# Patient Record
Sex: FEMALE | Race: WHITE | NOT HISPANIC OR LATINO | Employment: OTHER
[De-identification: names, ages, dates, MRNs, and addresses within clinical notes are randomized per-mention and may not be internally consistent; named-entity substitution may affect disease eponyms.]

---

## 2021-10-31 ENCOUNTER — HEALTH MAINTENANCE LETTER (OUTPATIENT)
Age: 86
End: 2021-10-31

## 2021-11-26 ENCOUNTER — TRANSFERRED RECORDS (OUTPATIENT)
Dept: HEALTH INFORMATION MANAGEMENT | Facility: CLINIC | Age: 86
End: 2021-11-26
Payer: MEDICARE

## 2021-12-01 ENCOUNTER — MEDICAL CORRESPONDENCE (OUTPATIENT)
Dept: HEALTH INFORMATION MANAGEMENT | Facility: CLINIC | Age: 86
End: 2021-12-01
Payer: MEDICARE

## 2021-12-02 ENCOUNTER — TRANSCRIBE ORDERS (OUTPATIENT)
Dept: OTHER | Age: 86
End: 2021-12-02
Payer: MEDICARE

## 2021-12-02 DIAGNOSIS — C44.310 BASAL CELL CARCINOMA (BCC) OF FACE: Primary | ICD-10-CM

## 2021-12-15 ENCOUNTER — TELEPHONE (OUTPATIENT)
Dept: DERMATOLOGY | Facility: CLINIC | Age: 86
End: 2021-12-15
Payer: MEDICARE

## 2021-12-15 NOTE — TELEPHONE ENCOUNTER
FUTURE VISIT INFORMATION      FUTURE VISIT INFORMATION:    Date: 12.21.21    Time: 1:30    Location: Telephone  REFERRAL INFORMATION:    Referring provider:  Dr. Whalen    Referring providers clinic:  [facility]    Reason for visit/diagnosis  consult for BCC on left temple     RECORDS REQUESTED FROM:       Clinic name Comments Records Status Photos Status   [facility] 11.26.21  Dr. Whalen  Path # [id number] CE Received

## 2021-12-21 ENCOUNTER — PRE VISIT (OUTPATIENT)
Dept: DERMATOLOGY | Facility: CLINIC | Age: 86
End: 2021-12-21
Payer: MEDICARE

## 2021-12-21 ENCOUNTER — VIRTUAL VISIT (OUTPATIENT)
Dept: DERMATOLOGY | Facility: CLINIC | Age: 86
End: 2021-12-21
Payer: MEDICARE

## 2021-12-21 DIAGNOSIS — C44.319 BASAL CELL CARCINOMA OF LEFT TEMPLE REGION: Primary | ICD-10-CM

## 2021-12-21 PROCEDURE — 99441 PR PHYSICIAN TELEPHONE EVALUATION 5-10 MIN: CPT | Performed by: DERMATOLOGY

## 2021-12-21 NOTE — LETTER
12/21/2021       RE: Sera Diaz  [address]  [place] [address]     Dear Colleague,    Thank you for referring your patient, Sera Diaz, to the [facility] DERMATOLOGIC SURGERY CLINIC [place] at . Please see a copy of my visit note below.    Mohs Micrographic Surgery Consult Note    Dec 21, 2021  Start time (if telephone encounter): 1:30 p.m.  End time (if telephone encounter): 1:40 p.m.    Dermatology Problem List:  # BCC, left temple, pending Mohs    Subjective: The patient is a 86 year old woman who presents today for Mohs micrographic surgery consultation for a recent diagnosis of skin cancer.    Skin cancer(s): BCC  Location(s): left temple  Associated symptoms: pruritus, tenderness to touch  Onset: within last 1 year    No other associated symptoms, modifying factors, or prior treatments, except when noted above. The patient denies any constitutional symptoms, lymphadenopathy, unintentional weight loss or decreased appetite. No other skin concerns today.    Objective:   Skin: Photos not submitted    Assessment and Plan:     1. Plan for Mohs micrographic surgery for skin cancer(s) above:  - We discussed the nature of the diagnosis/condition above. We discussed the treatment options, including the risks benefits and expectations of these options. We recommend micrographic surgery as the most effective and most tissue sparing option for treatment, and the patient agrees to proceed with this.  The patient is aware of the risks, benefits and expectations of this procedure. The patient will be scheduled for this procedure, if not already done so.  - We anticipate the following closure type: Linear vs flap    The patient was discussed with and evaluated by attending physician, Ilia Fulton MD.    Sterling Serna MD  Micrographic Surgery and Dermatologic Oncology (MSDO) Fellow    Scribe Disclosure:  Trey LORA am  serving as a scribe to document services personally performed by Ilia Fulton MD based on data collection and the provider's statements to me.

## 2021-12-21 NOTE — PROGRESS NOTES
Mohs Micrographic Surgery Consult Note    Dec 21, 2021  Start time (if telephone encounter): 1:30 p.m.  End time (if telephone encounter): 1:40 p.m.    Dermatology Problem List:  # BCC, left temple, pending Mohs    Subjective: The patient is a 86 year old woman who presents today for Mohs micrographic surgery consultation for a recent diagnosis of skin cancer.    Skin cancer(s): BCC  Location(s): left temple  Associated symptoms: pruritus, tenderness to touch  Onset: within last 1 year    No other associated symptoms, modifying factors, or prior treatments, except when noted above. The patient denies any constitutional symptoms, lymphadenopathy, unintentional weight loss or decreased appetite. No other skin concerns today.    Objective:   Skin: Photos not submitted    Assessment and Plan:     1. Plan for Mohs micrographic surgery for skin cancer(s) above:  - We discussed the nature of the diagnosis/condition above. We discussed the treatment options, including the risks benefits and expectations of these options. We recommend micrographic surgery as the most effective and most tissue sparing option for treatment, and the patient agrees to proceed with this.  The patient is aware of the risks, benefits and expectations of this procedure. The patient will be scheduled for this procedure, if not already done so.  - We anticipate the following closure type: Linear vs flap    The patient was discussed with and evaluated by attending physician, Ilia Fulton MD.    Sterling Serna MD  Micrographic Surgery and Dermatologic Oncology (MSDO) Fellow    Scribe Disclosure:  Trey LORA, am serving as a scribe to document services personally performed by Ilia Fulton MD based on data collection and the provider's statements to me.

## 2022-01-12 ENCOUNTER — OFFICE VISIT (OUTPATIENT)
Dept: DERMATOLOGY | Facility: CLINIC | Age: 87
End: 2022-01-12
Payer: MEDICARE

## 2022-01-12 VITALS — SYSTOLIC BLOOD PRESSURE: 133 MMHG | HEART RATE: 81 BPM | DIASTOLIC BLOOD PRESSURE: 67 MMHG

## 2022-01-12 DIAGNOSIS — C44.319 BASAL CELL CARCINOMA (BCC) OF LEFT TEMPLE REGION: Primary | ICD-10-CM

## 2022-01-12 PROCEDURE — 14040 TIS TRNFR F/C/C/M/N/A/G/H/F: CPT | Mod: GC | Performed by: DERMATOLOGY

## 2022-01-12 PROCEDURE — 17311 MOHS 1 STAGE H/N/HF/G: CPT | Mod: GC | Performed by: DERMATOLOGY

## 2022-01-12 PROCEDURE — 17312 MOHS ADDL STAGE: CPT | Mod: GC | Performed by: DERMATOLOGY

## 2022-01-12 RX ORDER — CYCLOSPORINE 0.5 MG/ML
1 EMULSION OPHTHALMIC
COMMUNITY
Start: 2021-12-08 | End: 2023-08-08

## 2022-01-12 RX ORDER — AMLODIPINE BESYLATE 2.5 MG/1
2.5 TABLET ORAL DAILY
Status: ON HOLD | COMMUNITY
Start: 2021-12-08 | End: 2023-09-01

## 2022-01-12 RX ORDER — DILTIAZEM HYDROCHLORIDE 60 MG/1
TABLET, FILM COATED ORAL
COMMUNITY
Start: 2021-09-29 | End: 2023-08-08

## 2022-01-12 RX ORDER — CLONAZEPAM 0.5 MG/1
TABLET ORAL
COMMUNITY
Start: 2021-09-08 | End: 2023-08-08

## 2022-01-12 RX ORDER — CYCLOSPORINE 0.5 MG/ML
EMULSION OPHTHALMIC
COMMUNITY
Start: 2021-12-20 | End: 2023-08-08

## 2022-01-12 RX ORDER — AMLODIPINE BESYLATE 2.5 MG/1
TABLET ORAL
COMMUNITY
Start: 2021-12-31 | End: 2023-08-08

## 2022-01-12 RX ORDER — ALENDRONATE SODIUM 70 MG/1
70 TABLET ORAL
COMMUNITY
Start: 2021-08-18 | End: 2023-08-08

## 2022-01-12 RX ORDER — BUDESONIDE 180 UG/1
AEROSOL, POWDER RESPIRATORY (INHALATION)
COMMUNITY
Start: 2021-12-27 | End: 2023-08-08

## 2022-01-12 RX ORDER — ALENDRONATE SODIUM 70 MG/1
TABLET ORAL
COMMUNITY
Start: 2021-11-08 | End: 2023-08-08

## 2022-01-12 NOTE — PATIENT INSTRUCTIONS
Wound Care Instructions  I will experience scar, altered skin color, bleeding, swelling, pain, crusting and redness. I may experience altered sensation. Risks are excessive bleeding, infection, muscle weakness, thick (hypertrophic or keloidal) scar, and recurrence,. A second procedure may be recommended to obtain the best cosmetic or functional result.  Possible complications of any surgical procedure are bleeding, infection, scarring, alteration in skin color and sensation, muscle weakness in the area, wound dehiscence or seperation, or recurrence of the lesion or disease. On occasion, after healing, a secondary procedure or revision may be recommended in order to obtain the best cosmetic or functional result.   After your surgery, a pressure bandage will be placed over the area that has sutures. This will help prevent bleeding.   For the First 48 hours After Surgery:  1. Leave the pressure bandage on and keep it dry. If it should come loose, you may retape it, but do not take it off.  2. Relax and take it easy. Do not do any vigorous exercise, heavy lifting, or bending forward. This could cause the wound to bleed.  3. Post-operative pain is usually mild. You may take 1000 mg of extra strength Tylenol every 6 hours as needed (do not take more than 4,000mg in one day). Alternate with 600mg of ibuprofen every 6 hours as well. Alternating Tylenol and Ibuprofen will be most effective for pain control. For instance, take 1000mg of Tylenol at 1pm, take 600mg of ibuprofen at 3pm, retake tylenol at 7pm (6 hours after last dose), reake ibuprofen at 9pm (6 hours after last dose). Avoid alcohol and vitamin E as these may increase your tendency to bleed.  4. You may put an ice pack around the bandaged area for 20 minutes every 2-3 hours. This may help reduce swelling, bruising, and pain. Make sure the ice pack is waterproof so that the pressure bandage does not get wet.   5. You may see a small amount of drainage or blood on  your pressure bandage. This is normal. However, if drainage or bleeding continues or saturates the bandage, you will need to apply firm pressure over the bandage with a washcloth for 15 minutes. If bleeding continues after applying pressure for 15 minutes then go to the nearest emergency room.  48 Hours After Surgery  Carefully remove the bandage and start daily wound care and dressing changes. You may also now shower and get the wound wet. Wash wound with a mild soap and water.  Use caution when washing the wound. Be gentle and do not let the forceful shower stream hit the wound directly.  PAT dry.  Daily Wound Care:  1. Wash wound with a mild soap and water.  Use caution when washing the wound, be gentle and do not let the forceful shower stream hit the wound directly.  2. PAT DRY.  3. Apply Vaseline (from a new container or tube) over the suture line with a Q-tip. It is very important to keep the wound continuously moist, as wounds heal best in a moist environment.  4.  Keep the site covered until sutures are removed, you can cover it with a Telfa (non-stick) dressing and tape or a band-aid.    5. If you are unable to keep wound covered, you must apply Vaseline every 2 - 3 hours (while awake) to ensure it is being kept moist for optimal healing. A dressing overnight is recommended to keep the area moist.   Call Us If:  1. You have pain that is not controlled with Tylenol.  2. You have signs or symptoms of an infection, such as: fever over 100 degrees F, redness, warmth, or foul-smelling or yellow/creamy drainage from the wound.  Who should I call with questions?    [facility]: [phone]     [facility]: [phone]    For urgent needs outside of business hours call the [facility] at [phone] and ask for the dermatology resident on call

## 2022-01-12 NOTE — LETTER
1/12/2022       RE: Sera Diaz  [address]  [place] [address]     Dear Colleague,    Thank you for referring your patient, Sera Diaz, to the [facility] DERMATOLOGIC SURGERY CLINIC [place] at [facility]. Please see a copy of my visit note below.    [facility] Mohs Surgery Procedure Note    Case #: 1  Date of Service:  Jan 12, 2022  Surgery: Mohs micrographic surgery (MMS)  Staff surgeon: Ilia Fulton MD  Fellow surgeon: Sterling Serna MD  Resident surgeon: Leeanna Atwood MD  Nurse: Gabriella Weinberg RN    Tumor Type: BCC  Location: Left temple  Derm-Path Accession #: Outside path from [facility]    Mohs Accession #:   Pre-Op Size: 1.0 cm x 1.0 cm  Final Defect Size: 2.1 cm x 1.9 cm  Number of Mohs stages: 2  Level of Defect: Fascia  Local anesthetic: 6 mL 1% lidocaine with epinephrine  Repair Type: Transposition flap  Repair Size: 3 x 1.8 cm  Suture Material: 4-0 Monocryl; 5-0 fast absorbing gut    Procedure:    Stage I  We discussed the principles of treatment and most likely complications including scarring, bleeding, infection, swelling, pain, crusting, nerve damage, large wound,  incomplete excision, wound dehiscence,  nerve damage, recurrence, and a second procedure may be recommended to obtain the best cosmetic or functional result.    Informed consent was obtained and the patient underwent the procedure as follows:  The patient was placed supine on the operating table.  The cancer was identified, outlined with a marker, and verified by the patient.  The entire surgical field was prepped with chlorhexidine.  The surgical site was anesthetized using lidocaine with epinephrine.    The area of clinically apparent tumor was debulked. The layer of tissue was then surgically excised using a #15 blade and was then transferred onto a specimen sheet maintaining the orientation of the specimen. Hemostasis was  obtained using electrocoagulation. The wound site was then covered with a dressing while the tissue samples were processed for examination.    The excised tissue was transported to the Mohs histology laboratory maintaining the tissue orientation.  The tissue specimen was relaxed so that the entire surgical margin was in a a single horizontal plane for sectioning and inked for precise mapping.  A precise reference map was drawn to reflect the sectioning of the specimen, colored inking of the margins, and orientation on the patient.  The tissue was processed using horizontal sectioning of the base and continuous peripheral margins.  The histopathologic sections were reviewed in conjunction with the reference map.    Total blocks: 1  Total slides: 2    Residual tumor was identified and indicated in red on the reference map, identifying the location where further tissue excision was necessary. Therefore, an additional stage of Mohs Micrographic surgery was deemed necessary.    Stage II  The patient was returned to the operating room, and the area prepped in the usual manner. The residual tumor was excised using the reference map as a guide. The specimen was transfered to a labeled specimen sheet maintaining the orientation of the specimen. Hemostasis was obtained and the wound site was covered with a dressing while the tissue was processed for examination.     The excised tissue was transported to the Mohs histology laboratory maintaining orientation. The specimen margins were inked for precise mapping and a reference map was prepared for the is additional stage to maintain precise orientation as described above. The tissue was processed using horizontal sectioning of the base and continuous peripheral margins. The histopathologic sections were reviewed in conjunction with the reference map.     Total blocks: 1  Total slides: 2    There were no cancer cells visualized on examination, therefore Mohs surgery was  complete.    PROCEDURE: Rhombic Transposition Flap    The patient was taken to the operative suite and placed supine on the operating room table.  The wound was identified and infiltrated with lidocaine with epinephrine.  The defect was then cleansed and prepped with chlorhexidine and draped with sterile drapes.  Using a marker, a rhomboid transposition flap was planned.  The wound edges were then debeveled and the wound was undermined bluntly in all directions.  The transposition flap was incised sharply to the level of fat.  The flap was undermined from all surrounding tissue. Hemostasis was obtained with electrocoagulation.  The flap was transposed into the primary defect. The secondary defect was closed with deep dermal 4-0 Monocryl sutures. Epidermal tissue was carefully approximated using 5-0 fast absorbing gut sutures in a simple running fashion throughout the length of the flap.  Any redundant skin was excised by the triangulation technique, and closed in similar fashion.  The wound was cleansed with sterile saline and Vaseline was applied. A sterile non-adherent pressure dressing was placed.  The patient left the operating suite in stable condition. Wound care was reviewed verbally and in writing.    Follow-up for suture removal: Not applicable as only dissolving sutures used    Ilia Fulton MD was immediately available for the entire surgery and was physicially present for the key portions of the procedure.    Dr. Sterling Serna (Mohs micrographic surgery fellow) performed the micrographic surgery; and Ilia Fulton MD performed the reconstruction/repair and was present for the entire micrographic surgery and always immediately available.    Scribe Disclosure:  Glenda LORA, am serving as a scribe to document services personally performed by Ilia Fulton MD based on data collection and the provider's statements to me.     Attestation signed by Ilia Fulton MD at 1/20/2022 11:25 AM:    Attending attestation:  GENO  was present for key elements of the procedure and immediately available for all other portions of the procedure.  I have reviewed the note and edited it as necessary.    Ilia Fulton M.D.  Professor  Director of Dermatologic Surgery  Department of Dermatology  [facility]    Dermatology Surgery Clinic  [facility] [place] [id number]

## 2022-01-12 NOTE — PROGRESS NOTES
[facility] Mohs Surgery Procedure Note    Case #: 1  Date of Service:  Jan 12, 2022  Surgery: Mohs micrographic surgery (MMS)  Staff surgeon: Ilia Fulton MD  Fellow surgeon: Sterling Serna MD  Resident surgeon: Leeanna Atwood MD  Nurse: Gabriella Weinberg RN    Tumor Type: BCC  Location: Left temple  Derm-Path Accession #: Outside path from [facility]    Mohs Accession #:   Pre-Op Size: 1.0 cm x 1.0 cm  Final Defect Size: 2.1 cm x 1.9 cm  Number of Mohs stages: 2  Level of Defect: Fascia  Local anesthetic: 6 mL 1% lidocaine with epinephrine  Repair Type: Transposition flap  Repair Size: 3 x 1.8 cm  Suture Material: 4-0 Monocryl; 5-0 fast absorbing gut    Procedure:    Stage I  We discussed the principles of treatment and most likely complications including scarring, bleeding, infection, swelling, pain, crusting, nerve damage, large wound,  incomplete excision, wound dehiscence,  nerve damage, recurrence, and a second procedure may be recommended to obtain the best cosmetic or functional result.    Informed consent was obtained and the patient underwent the procedure as follows:  The patient was placed supine on the operating table.  The cancer was identified, outlined with a marker, and verified by the patient.  The entire surgical field was prepped with chlorhexidine.  The surgical site was anesthetized using lidocaine with epinephrine.    The area of clinically apparent tumor was debulked. The layer of tissue was then surgically excised using a #15 blade and was then transferred onto a specimen sheet maintaining the orientation of the specimen. Hemostasis was obtained using electrocoagulation. The wound site was then covered with a dressing while the tissue samples were processed for examination.    The excised tissue was transported to the Mohs histology laboratory maintaining the tissue orientation.  The tissue specimen was relaxed so that the entire surgical margin was in a a single  horizontal plane for sectioning and inked for precise mapping.  A precise reference map was drawn to reflect the sectioning of the specimen, colored inking of the margins, and orientation on the patient.  The tissue was processed using horizontal sectioning of the base and continuous peripheral margins.  The histopathologic sections were reviewed in conjunction with the reference map.    Total blocks: 1  Total slides: 2    Residual tumor was identified and indicated in red on the reference map, identifying the location where further tissue excision was necessary. Therefore, an additional stage of Mohs Micrographic surgery was deemed necessary.    Stage II  The patient was returned to the operating room, and the area prepped in the usual manner. The residual tumor was excised using the reference map as a guide. The specimen was transfered to a labeled specimen sheet maintaining the orientation of the specimen. Hemostasis was obtained and the wound site was covered with a dressing while the tissue was processed for examination.     The excised tissue was transported to the Mohs histology laboratory maintaining orientation. The specimen margins were inked for precise mapping and a reference map was prepared for the is additional stage to maintain precise orientation as described above. The tissue was processed using horizontal sectioning of the base and continuous peripheral margins. The histopathologic sections were reviewed in conjunction with the reference map.     Total blocks: 1  Total slides: 2    There were no cancer cells visualized on examination, therefore Mohs surgery was complete.    PROCEDURE: Rhombic Transposition Flap    The patient was taken to the operative suite and placed supine on the operating room table.  The wound was identified and infiltrated with lidocaine with epinephrine.  The defect was then cleansed and prepped with chlorhexidine and draped with sterile drapes.  Using a marker, a rhomboid  transposition flap was planned.  The wound edges were then debeveled and the wound was undermined bluntly in all directions.  The transposition flap was incised sharply to the level of fat.  The flap was undermined from all surrounding tissue. Hemostasis was obtained with electrocoagulation.  The flap was transposed into the primary defect. The secondary defect was closed with deep dermal 4-0 Monocryl sutures. Epidermal tissue was carefully approximated using 5-0 fast absorbing gut sutures in a simple running fashion throughout the length of the flap.  Any redundant skin was excised by the triangulation technique, and closed in similar fashion.  The wound was cleansed with sterile saline and Vaseline was applied. A sterile non-adherent pressure dressing was placed.  The patient left the operating suite in stable condition. Wound care was reviewed verbally and in writing.    Follow-up for suture removal: Not applicable as only dissolving sutures used    Ilia Fulton MD was immediately available for the entire surgery and was physicially present for the key portions of the procedure.    Dr. Sterling Serna (Mohs micrographic surgery fellow) performed the micrographic surgery; and Ilia Fulton MD performed the reconstruction/repair and was present for the entire micrographic surgery and always immediately available.    Scribe Disclosure:  IGlenda, am serving as a scribe to document services personally performed by Ilia Fulton MD based on data collection and the provider's statements to me.

## 2022-04-12 ENCOUNTER — TRANSCRIBE ORDERS (OUTPATIENT)
Dept: OTHER | Age: 87
End: 2022-04-12
Payer: MEDICARE

## 2022-04-12 DIAGNOSIS — C44.311 BASAL CELL CARCINOMA (BCC) OF DORSUM OF NOSE: Primary | ICD-10-CM

## 2022-04-14 ENCOUNTER — TELEPHONE (OUTPATIENT)
Dept: DERMATOLOGY | Facility: CLINIC | Age: 87
End: 2022-04-14
Payer: MEDICARE

## 2022-04-14 NOTE — TELEPHONE ENCOUNTER
FUTURE VISIT INFORMATION      FUTURE VISIT INFORMATION:    Date: 5.25.22    Time: 9:00    Location: [place]  REFERRAL INFORMATION:    Referring provider:  Koby    Referring providers clinic:  [facility]    Reason for visit/diagnosis  BCC dorsum of nose return pt    RECORDS REQUESTED FROM:       Clinic name Comments Records Status Imaging Status   [facility] 4.1.22  Path # [id number] CE Received

## 2022-04-14 NOTE — TELEPHONE ENCOUNTER
Left patient voicemail to schedule a consult and MOHS for BCC dorsum of nose with Dr. Donaldo Ponce, Procedure

## 2022-05-19 ENCOUNTER — MYC MEDICAL ADVICE (OUTPATIENT)
Dept: DERMATOLOGY | Facility: CLINIC | Age: 87
End: 2022-05-19
Payer: MEDICARE

## 2022-05-25 ENCOUNTER — PRE VISIT (OUTPATIENT)
Dept: DERMATOLOGY | Facility: CLINIC | Age: 87
End: 2022-05-25
Payer: MEDICARE

## 2022-05-25 ENCOUNTER — OFFICE VISIT (OUTPATIENT)
Dept: DERMATOLOGY | Facility: CLINIC | Age: 87
End: 2022-05-25
Payer: MEDICARE

## 2022-05-25 VITALS — SYSTOLIC BLOOD PRESSURE: 126 MMHG | DIASTOLIC BLOOD PRESSURE: 67 MMHG

## 2022-05-25 DIAGNOSIS — C44.311 BASAL CELL CARCINOMA (BCC) OF DORSUM OF NOSE: Primary | ICD-10-CM

## 2022-05-25 PROCEDURE — 14061 TIS TRNFR E/N/E/L10.1-30SQCM: CPT | Mod: GC | Performed by: DERMATOLOGY

## 2022-05-25 PROCEDURE — 17311 MOHS 1 STAGE H/N/HF/G: CPT | Mod: GC | Performed by: DERMATOLOGY

## 2022-05-25 ASSESSMENT — PAIN SCALES - GENERAL: PAINLEVEL: NO PAIN (0)

## 2022-05-25 NOTE — PATIENT INSTRUCTIONS
Wound care    I will experience scar, bleeding, swelling, pain, crusting and redness. I may experience incomplete removal or recurrence. Risks are bleeding, bruising, swelling, infection, nerve damage, & a large wound. A second procedure may be recommended to obtain the best cosmetic or functional result.       A three month office visit with your Surgeon is recommended for scar evaluation. Please reach out sooner if you have concerns about you surgical site/wound.    Caring for your skin after surgery    After your surgery, a pressure bandage will be placed over the area that has stitches. This is important to prevent bleeding. Please follow these instructions over the next 1 to 2 weeks. Following this regimen will help to prevent complications as your wound heals.     For the first 48 hours after your surgery:    Leave the pressure dressing on and keep it dry. If it should come loose, you may re-tape it, but do not take it off.  Relax and take it easy. Do not do any vigorous exercise or heavy lifting. This could cause the wound to bleed.  Post-Operative pain is usually mild. If you are able to take tylenol, You may take plain or extra-strength Tylenol (acetaminophen) As directed on the bottle (do not take more than 4,000mg in one day). If you are able to take ibuprofen, you can alternate the tylenol and ibuprofen.   Avoid alcohol as this may increase your tendency to bleed.   You may put an ice pack around the bandaged area for 20 minutes at a time as needed. This may help reduce swelling, bruising, and pain. Make sure the ice pack is waterproof so that the pressure bandage doesn t get wet.  If the wound is on the face try to sleep with your head elevated. Either in a recliner or propped up in bed, this will decrease swelling around the eyes.   You may see a small amount of drainage or blood on your pressure bandage. This is normal. However:  If drainage or bleeding continues or saturates the bandage, you will  need to apply firm pressure over the bandage with a piece of gauze for 15 minutes.  If bleeding continues after applying pressure for 15 minutes, apply an ice pack to the bandaged area for 15 minutes.  If bleeding still continues, call our office or go to the nearest emergency room.    Remove pressure dressing 48 hours after surgery:    Carefully remove the pressure bandage. If it seems sticky or too difficult to get off, you may need to soak it off in the shower.  After the pressure dressing is removed, you may shower and get the wound wet. However, Do Not let the forceful stream of the shower hit the wound directly.  Follow these wound care and dressing change instructions:   In the shower was the surgical site last with its own separate was cloth.  You may allow water to run over the site. Take a clean wash cloth wet with soapy warm water and gently pat the suture site to help remove any crust or drainage.   Do Not rub or scrub the site    After site is clean pat dry and apply a thin layer of Vaseline ointment  over the suture site with a cotton swab or clean finger.   Cover the suture site with Telfa (non-stick) dressing. You may tape a piece of gauze over the Telfa for extra protection if you wish.  Continue wound care at least once a day, twice if you are active or around a contaminated environment.  Continue daily wound care until your surgical site is completely healed. To determine this, around 2 weeks post procedure you can take a cotton swab with a small amount of Hydrogen peroxide and roll it on the suture site. If the site bubbles and turns white your wound is still healing. Please continue wound care until the area no longer bubbles up from the hydrogen peroxide.   Dissolving stitches, if you have been told your stitches are dissolving they should dissolve in one to one and a half week. If the stiches do not dissolve by one and a half week you can use a Qtip with hydrogen peroxide on it and roll it  along the suture line to help the stitches dissolve and come out. Please give us a call if stitches are still in after two weeks. If you do not keep your wound moist at all times while it heals the dissolving sutures will dry out and not dissolve.         Follow up will be a 3 month scar evaluation either in person or via a telephone visit with you sending in a photo via [employer]. Unless you have been told to follow up sooner or if you have concerns and would like to be see sooner. Please call or send us in a [employer] message if possible and attach a photo.        What to expect:    The first couple of days your wound may be tender and may bleed slightly when doing wound care.  There may be swelling and bruising around the wound, especially if it is near the eyes. For your comfort, you may apply ice or cold compresses to the bruises after your have removed the pressure bandage.  The area around your wound may be numb for several weeks or even months.  You may experience periodic sharp pain or mild itching around the wound as it heals.   The suture line will look dark for a while but will lighten over time.       When to call us:    You have bleeding that will not stop after applying pressure and ice.  You have pain that is not controlled with Tylenol (acetaminophen.)  You have signs or symptoms of an infection such as:  Fever over 100 degrees Fahrenheit  Redness, warmth or foul-smelling drainage from the wound  If you have any questions, or are not sure how to take care of the wound.    Phone numbers:    During business hours (M-F 8:00-4:30 p.m.)  [facility]-  [phone] Option 1 appt. desk  [phone]  Option 3 nurse triage line  ---------------------------------------------------------  Evenings/Weekends/Holidays  Hospital - [phone]   TTY for hearing oohdpjfl-[phone]  *Ask  to page dermatologist on-call  Emergency Yoai-[phone]  TTY for hearing impaired-  [phone]

## 2022-05-25 NOTE — LETTER
5/25/2022       RE: Sera Diaz  [address]  [place] [address]     Dear Colleague,    Thank you for referring your patient, Sera Diaz, to the [facility] [place] [facility]. Please see a copy of my visit note below.    [facility] Mohs Surgery Procedure Note    Case #: 1  Date of Service:  May 25, 2022  Surgery: Mohs micrographic surgery (MMS)  Staff surgeon: Ilia Fulton MD  Fellow surgeon: Sterling Serna MD  Resident surgeon: None  Nurse: Lina Falcon CMA    Tumor Type: BCC  Location: Dorsum of nose  Derm-Path Accession #: Outside path report from [employer], Order: [id number]      Mohs Accession #:   Pre-Op Size: 0.8 cm x 0.8 cm  Final Defect Size: 1.5 cm x 1.4 cm  Number of Mohs stages: 1  Level of Defect: Fascia  Local anesthetic: 6 mL 1% lidocaine with epinephrine  Repair Type: REPAIR: Bilobed Transposition Flap (Nose)    The patient was taken to the operative suite and placed supine on the operating room table.  The wound was identified and infiltrated with 1% lidocaine with epinephrine.  The defect was then cleansed and prepped with chlorhexidine and draped with sterile drapes.  Using a marker, a bilobed transposition flap repair was planned.  The wound edges were then debeveled and the wound was undermined bluntly in all directions. The transposition flap was incised sharply to the level of the subnasalis muscle.  The flap was undermined from all surrounding tissue. Hemostasis was obtained with electrodessication.  The flap was transposed into the primary defect.  The secondary defect and flap were closed with deep dermal 5-0 Monocryl sutures.  Epidermal tissue was carefully approximated using 6-0 fast absorbing gut in a simple running fashion throughout the length of the flap.  Redundant skin was excised by the triangulation technique and closed in similar fashion.   The wound was cleansed with sterile saline and Vaseline was applied. A sterile non-adherent pressure dressing was placed.  The patient left the operating suite in stable condition.    Follow-up in 3 months or prn    Ilia Fulton MD was immediately available for the entire surgery and was physicially present for the key portions of the procedure.  Repair Size: 3.0 cm x 4.0 cm  Suture Material: 5-0 Monocryl; 6-0 fast absorbing gut    Procedure:    Stage I  We discussed the principles of treatment and most likely complications including scarring, bleeding, infection, swelling, pain, crusting, nerve damage, large wound,  incomplete excision, wound dehiscence,  nerve damage, recurrence, and a second procedure may be recommended to obtain the best cosmetic or functional result.    Informed consent was obtained and the patient underwent the procedure as follows:  The patient was placed supine on the operating table.  The cancer was identified, outlined with a marker, and verified by the patient.  The entire surgical field was prepped with chlorhexidine.  The surgical site was anesthetized using lidocaine with epinephrine.    The area of clinically apparent tumor was debulked. The layer of tissue was then surgically excised using a #15 blade and was then transferred onto a specimen sheet maintaining the orientation of the specimen. Hemostasis was obtained using electrocoagulation. The wound site was then covered with a dressing while the tissue samples were processed for examination.    The excised tissue was transported to the Mohs histology laboratory maintaining the tissue orientation.  The tissue specimen was relaxed so that the entire surgical margin was in a a single horizontal plane for sectioning and inked for precise mapping.  A precise reference map was drawn to reflect the sectioning of the specimen, colored inking of the margins, and orientation on the patient.  The tissue was processed using horizontal  sectioning of the base and continuous peripheral margins.  The histopathologic sections were reviewed in conjunction with the reference map.    Total blocks: 1  Total slides: 2    There were no cancer cells visualized on examination, therefore Mohs surgery was complete.      Dr. Sterling Serna (Mohs micrographic surgery fellow) performed the Mohs micrographic surgery and reconstruction under the direct supervision of Ilia Fulton MD, who was present for the entire micrographic surgery and key portions of the reconstruction, and always immediately available.      Scribe Disclosure:  I, Sakina Shrestha, am serving as a scribe to document services personally performed by Ilia Fulton MD based on data collection and the provider's statements to me.     Attestation signed by Ilia Fulton MD at 5/25/2022  3:59 PM:    Attending attestation:  I was present for key elements of the procedure and immediately available for all other portions of the procedure.  I have reviewed the note and edited it as necessary.    Ilia Fulton M.D.  Professor  Director of Dermatologic Surgery  Department of Dermatology  [facility]    Dermatology Surgery Clinic  [facility] [place] [address]

## 2022-05-25 NOTE — PROGRESS NOTES
[facility] Mohs Surgery Procedure Note    Case #: 1  Date of Service:  May 25, 2022  Surgery: Mohs micrographic surgery (MMS)  Staff surgeon: Ilia Fulton MD  Fellow surgeon: Sterling Serna MD  Resident surgeon: None  Nurse: Lina Falcon CMA    Tumor Type: BCC  Location: Dorsum of nose  Derm-Path Accession #: Outside path report from [facility], Order: [id number]      Mohs Accession #:   Pre-Op Size: 0.8 cm x 0.8 cm  Final Defect Size: 1.5 cm x 1.4 cm  Number of Mohs stages: 1  Level of Defect: Fascia  Local anesthetic: 6 mL 1% lidocaine with epinephrine  Repair Type: REPAIR: Bilobed Transposition Flap (Nose)    The patient was taken to the operative suite and placed supine on the operating room table.  The wound was identified and infiltrated with 1% lidocaine with epinephrine.  The defect was then cleansed and prepped with chlorhexidine and draped with sterile drapes.  Using a marker, a bilobed transposition flap repair was planned.  The wound edges were then debeveled and the wound was undermined bluntly in all directions. The transposition flap was incised sharply to the level of the subnasalis muscle.  The flap was undermined from all surrounding tissue. Hemostasis was obtained with electrodessication.  The flap was transposed into the primary defect.  The secondary defect and flap were closed with deep dermal 5-0 Monocryl sutures.  Epidermal tissue was carefully approximated using 6-0 fast absorbing gut in a simple running fashion throughout the length of the flap.  Redundant skin was excised by the triangulation technique and closed in similar fashion.  The wound was cleansed with sterile saline and Vaseline was applied. A sterile non-adherent pressure dressing was placed.  The patient left the operating suite in stable condition.    Follow-up in 3 months or prn    Ilia Fulton MD was immediately available for the entire surgery and was physicially present for the key portions of  the procedure.  Repair Size: 3.0 cm x 4.0 cm  Suture Material: 5-0 Monocryl; 6-0 fast absorbing gut    Procedure:    Stage I  We discussed the principles of treatment and most likely complications including scarring, bleeding, infection, swelling, pain, crusting, nerve damage, large wound,  incomplete excision, wound dehiscence,  nerve damage, recurrence, and a second procedure may be recommended to obtain the best cosmetic or functional result.    Informed consent was obtained and the patient underwent the procedure as follows:  The patient was placed supine on the operating table.  The cancer was identified, outlined with a marker, and verified by the patient.  The entire surgical field was prepped with chlorhexidine.  The surgical site was anesthetized using lidocaine with epinephrine.    The area of clinically apparent tumor was debulked. The layer of tissue was then surgically excised using a #15 blade and was then transferred onto a specimen sheet maintaining the orientation of the specimen. Hemostasis was obtained using electrocoagulation. The wound site was then covered with a dressing while the tissue samples were processed for examination.    The excised tissue was transported to the [facility] histology laboratory maintaining the tissue orientation.  The tissue specimen was relaxed so that the entire surgical margin was in a a single horizontal plane for sectioning and inked for precise mapping.  A precise reference map was drawn to reflect the sectioning of the specimen, colored inking of the margins, and orientation on the patient.  The tissue was processed using horizontal sectioning of the base and continuous peripheral margins.  The histopathologic sections were reviewed in conjunction with the reference map.    Total blocks: 1  Total slides: 2    There were no cancer cells visualized on examination, therefore Mohs surgery was complete.      Dr. Sterling Serna (Mohs micrographic surgery fellow) performed the  Mohs micrographic surgery and reconstruction under the direct supervision of Ilia Fulton MD, who was present for the entire micrographic surgery and key portions of the reconstruction, and always immediately available.      Scribe Disclosure:  I, Sakina Shrestha, am serving as a scribe to document services personally performed by Ilia Fulton MD based on data collection and the provider's statements to me.

## 2022-05-25 NOTE — NURSING NOTE
Drug Administration Record    Prior to injection, verified patient identity using patient's name and date of birth.  Due to injection administration, patient instructed to remain in clinic for 15 minutes  afterwards, and to report any adverse reaction to me immediately.    Drug Name: triamcinolone acetonide(kenalog)  Dose: 0.3mL of triamcinolone 40mg/mL, 12mg dose  Route administered: ID  NDC #: Kenalog-40 ([address]-5)  Amount of waste(mL):0.7  Reason for waste: Single use vial    LOT #: [id number]  SITE: see note  : amneal  EXPIRATION DATE: 11/2023

## 2022-06-24 ENCOUNTER — PRE VISIT (OUTPATIENT)
Dept: UROLOGY | Facility: CLINIC | Age: 87
End: 2022-06-24

## 2022-06-24 NOTE — TELEPHONE ENCOUNTER
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

## 2022-07-22 ENCOUNTER — OFFICE VISIT (OUTPATIENT)
Dept: UROLOGY | Facility: CLINIC | Age: 87
End: 2022-07-22
Payer: MEDICARE

## 2022-07-22 VITALS
DIASTOLIC BLOOD PRESSURE: 80 MMHG | BODY MASS INDEX: 35.84 KG/M2 | HEART RATE: 75 BPM | HEIGHT: 66 IN | WEIGHT: 223 LBS | SYSTOLIC BLOOD PRESSURE: 143 MMHG

## 2022-07-22 DIAGNOSIS — N39.46 MIXED INCONTINENCE URGE AND STRESS: Primary | ICD-10-CM

## 2022-07-22 PROCEDURE — 99214 OFFICE O/P EST MOD 30 MIN: CPT | Performed by: OBSTETRICS & GYNECOLOGY

## 2022-07-22 RX ORDER — NYSTATIN 100000 U/G
CREAM TOPICAL
COMMUNITY
Start: 2022-01-21

## 2022-07-22 RX ORDER — VALACYCLOVIR HYDROCHLORIDE 1 G/1
TABLET, FILM COATED ORAL
Status: ON HOLD | COMMUNITY
Start: 2021-11-26 | End: 2023-09-01

## 2022-07-22 RX ORDER — PREDNISONE 5 MG/1
TABLET ORAL
COMMUNITY
Start: 2022-02-25

## 2022-07-22 RX ORDER — GUAIFENESIN 600 MG/1
TABLET, EXTENDED RELEASE ORAL
Status: ON HOLD | COMMUNITY
Start: 2022-07-17 | End: 2023-09-01

## 2022-07-22 RX ORDER — LEVALBUTEROL TARTRATE 45 UG/1
AEROSOL, METERED ORAL
COMMUNITY
Start: 2022-01-21

## 2022-07-22 RX ORDER — MONTELUKAST SODIUM 10 MG/1
10 TABLET ORAL AT BEDTIME
COMMUNITY

## 2022-07-22 RX ORDER — DIAZEPAM 2 MG
TABLET ORAL EVERY 6 HOURS PRN
COMMUNITY

## 2022-07-22 RX ORDER — SIMVASTATIN 10 MG
10 TABLET ORAL AT BEDTIME
COMMUNITY

## 2022-07-22 RX ORDER — DOCOSANOL 100 MG/G
CREAM TOPICAL
COMMUNITY

## 2022-07-22 RX ORDER — TRIAMTERENE AND HYDROCHLOROTHIAZIDE 37.5; 25 MG/1; MG/1
CAPSULE ORAL
COMMUNITY
Start: 2022-07-11

## 2022-07-22 RX ORDER — TOLTERODINE 4 MG/1
4 CAPSULE, EXTENDED RELEASE ORAL DAILY
Qty: 30 CAPSULE | Refills: 3 | Status: SHIPPED | OUTPATIENT
Start: 2022-07-22 | End: 2023-08-08

## 2022-07-22 RX ORDER — FLUTICASONE PROPIONATE 50 MCG
2 SPRAY, SUSPENSION (ML) NASAL
COMMUNITY
Start: 2022-02-08

## 2022-07-22 RX ORDER — TOLTERODINE TARTRATE 1 MG/1
1 TABLET, EXTENDED RELEASE ORAL DAILY
COMMUNITY
Start: 2022-05-24 | End: 2023-08-08

## 2022-07-22 ASSESSMENT — PAIN SCALES - GENERAL: PAINLEVEL: NO PAIN (0)

## 2022-07-22 NOTE — LETTER
Date:July 25, 2022      Provider requested that no letter be sent. Do not send.       [facility]

## 2022-07-22 NOTE — PATIENT INSTRUCTIONS
Please schedule a 2-month follow up appointment with Dr. Smith.     It was a pleasure meeting with you today.  Thank you for allowing me and my team the privilege of caring for you today.  YOU are the reason we are here, and I truly hope we provided you with the excellent service you deserve.  Please let us know if there is anything else we can do for you so that we can be sure you are leaving completely satisfied with your care experience.

## 2022-07-22 NOTE — PROGRESS NOTES
July 22, 2022    Referring Provider: Yashira Payan MD  PALLIATIVE CARE  [address] [place] [address]    Primary Care Provider: Rafaela Escobar    CC: urge urinary incontinence    HPI:  Sera Diaz is a 86 year old female who presents for evaluation of her pelvic floor symptoms.  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 She has tried oxybutynin and myrbetriq without improvement. She has been tolterodine in the past with some improvement. She recently restarted it at 1mg every day.        Social History     Socioeconomic History     Marital status:      Spouse name: Not on file     Number of children: Not on file     Years of education: Not on file     Highest education level: Not on file   Occupational History     Not on file   Tobacco Use     Smoking status: Never Smoker     Smokeless tobacco: Never Used   Substance and Sexual Activity     Alcohol use: Not Currently     Drug use: Not on file     Sexual activity: Not on file   Other Topics Concern     Not on file   Social History Narrative     Not on file     Social Determinants of Health     Financial Resource Strain: Not on file   Food Insecurity: Not on file   Transportation Needs: Not on file   Physical Activity: Not on file   Stress: Not on file   Social Connections: Not on file   Intimate Partner Violence: Not on file   Housing Stability: Not on file       No family history on file.    ROS    Allergies   Allergen Reactions     Sulfa Drugs Other (See Comments) and Rash     Face swells       Iodine Rash     Oxybutynin Other (See Comments)     Severe dry mouth     Mirabegron      Other reaction(s): Dizziness       Current Outpatient Medications   Medication     alendronate (FOSAMAX) 70 MG tablet     alendronate (FOSAMAX) 70 MG tablet     amLODIPine (NORVASC) 2.5 MG tablet     amLODIPine (NORVASC) 2.5 MG tablet     BETAMETHASONE PO     cholecalciferol 25 MCG (1000 UT) TABS     clonazePAM (KLONOPIN) 0.5 MG tablet     clonazePAM (KLONOPIN) 0.5 MG tablet  "    [employer] MUCUS EXTENDED RELEASE 600 MG 12 hr tablet     diazepam (VALIUM) 2 MG tablet     docosanol (ABREVA) 10 % CREA cream     fluticasone (FLONASE) 50 MCG/ACT nasal spray     montelukast (SINGULAIR) 10 MG tablet     nystatin (MYCOSTATIN) 230974 UNIT/GM external cream     omeprazole (PRILOSEC) 20 MG DR capsule     predniSONE (DELTASONE) 5 MG tablet     Propylene Glycol (SYSTANE COMPLETE OP)     PULMICORT FLEXHALER 180 MCG/ACT inhaler     simvastatin (ZOCOR) 10 MG tablet     tolterodine (DETROL) 1 MG tablet     tolterodine ER (DETROL LA) 4 MG 24 hr capsule     triamterene-HCTZ (DYAZIDE) 37.5-25 MG capsule     UNABLE TO FIND     valACYclovir (VALTREX) 1000 mg tablet     cycloSPORINE (RESTASIS) 0.05 % ophthalmic emulsion     levalbuterol (XOPENEX HFA) 45 MCG/ACT inhaler     RESTASIS 0.05 % ophthalmic emulsion     SYMBICORT 80-4.5 MCG/ACT Inhaler     No current facility-administered medications for this visit.       BP (!) 143/80   Pulse 75   Ht 1.664 m (5' 5.5\")   Wt 101.2 kg (223 lb)   BMI 36.54 kg/m   No LMP recorded. Patient is postmenopausal. Body mass index is 36.54 kg/m .  Ms. Diaz is alert, comfortable in no acute distress, non-labored breathing.     A/P: Sera Diaz is a 86 year old F with UUI    Will start tolterodine XL 4mg every day F/U in 2 months    I spent a total of 30 minutes with  Ms. Diaz  on the date of the encounter in chart review, face to face patient visit, review of tests, documentation and/or discussion with other providers about the issues documented above.    Abdirahman Smith MD  Professor, OB/GYN  Urogynecologist  CC  Patient Care Team:  Rafaela Escobar MD as PCP - General (Family Medicine)  Gi Gotti MD as MD (Urology)  Teena Viveros NP as Referring Physician  Ilia Fulton MD as Assigned Surgical Provider  Abdirahman Smith MD as MD (OB/Gyn)  Yashira Marion MD as Referring Physician (Hospice And Palliative Care)  YASHIRA MARION"

## 2022-07-22 NOTE — LETTER
7/22/2022       RE: Sera Diaz  [address]  [place] [address]     Dear Colleague,    Thank you for referring your patient, Sera Diaz, to the [facility] UROLOGY CLINIC [place] at [facility]. Please see a copy of my visit note below.    July 22, 2022    Referring Provider: Yashira Payan MD  PALLIATIVE CARE  [address] [place] [address]    Primary Care Provider: Rafaela Escobar    CC: urge urinary incontinence    HPI:  Sera Diaz is a 86 year old female who presents for evaluation of her pelvic floor symptoms.  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 She has tried oxybutynin and myrbetriq without improvement. She has been tolterodine in the past with some improvement. She recently restarted it at 1mg every day.        Social History     Socioeconomic History     Marital status:      Spouse name: Not on file     Number of children: Not on file     Years of education: Not on file     Highest education level: Not on file   Occupational History     Not on file   Tobacco Use     Smoking status: Never Smoker     Smokeless tobacco: Never Used   Substance and Sexual Activity     Alcohol use: Not Currently     Drug use: Not on file     Sexual activity: Not on file   Other Topics Concern     Not on file   Social History Narrative     Not on file     Social Determinants of Health     Financial Resource Strain: Not on file   Food Insecurity: Not on file   Transportation Needs: Not on file   Physical Activity: Not on file   Stress: Not on file   Social Connections: Not on file   Intimate Partner Violence: Not on file   Housing Stability: Not on file       No family history on file.    ROS    Allergies   Allergen Reactions     Sulfa Drugs Other (See Comments) and Rash     Face swells       Iodine Rash     Oxybutynin Other (See Comments)     Severe dry mouth     Mirabegron      Other reaction(s): Dizziness       Current Outpatient  "Medications   Medication     alendronate (FOSAMAX) 70 MG tablet     alendronate (FOSAMAX) 70 MG tablet     amLODIPine (NORVASC) 2.5 MG tablet     amLODIPine (NORVASC) 2.5 MG tablet     BETAMETHASONE PO     cholecalciferol 25 MCG (1000 UT) TABS     clonazePAM (KLONOPIN) 0.5 MG tablet     clonazePAM (KLONOPIN) 0.5 MG tablet     [facility] MUCUS EXTENDED RELEASE 600 MG 12 hr tablet     diazepam (VALIUM) 2 MG tablet     docosanol (ABREVA) 10 % CREA cream     fluticasone (FLONASE) 50 MCG/ACT nasal spray     montelukast (SINGULAIR) 10 MG tablet     nystatin (MYCOSTATIN) 464743 UNIT/GM external cream     omeprazole (PRILOSEC) 20 MG DR capsule     predniSONE (DELTASONE) 5 MG tablet     Propylene Glycol (SYSTANE COMPLETE OP)     PULMICORT FLEXHALER 180 MCG/ACT inhaler     simvastatin (ZOCOR) 10 MG tablet     tolterodine (DETROL) 1 MG tablet     tolterodine ER (DETROL LA) 4 MG 24 hr capsule     triamterene-HCTZ (DYAZIDE) 37.5-25 MG capsule     UNABLE TO FIND     valACYclovir (VALTREX) 1000 mg tablet     cycloSPORINE (RESTASIS) 0.05 % ophthalmic emulsion     levalbuterol (XOPENEX HFA) 45 MCG/ACT inhaler     RESTASIS 0.05 % ophthalmic emulsion     SYMBICORT 80-4.5 MCG/ACT Inhaler     No current facility-administered medications for this visit.       BP (!) 143/80   Pulse 75   Ht 1.664 m (5' 5.5\")   Wt 101.2 kg (223 lb)   BMI 36.54 kg/m   No LMP recorded. Patient is postmenopausal. Body mass index is 36.54 kg/m .  Ms. Diaz is alert, comfortable in no acute distress, non-labored breathing.     A/P: Sera Diaz is a 86 year old F with UUI    Will start tolterodine XL 4mg every day F/U in 2 months    I spent a total of 30 minutes with  Ms. Diaz  on the date of the encounter in chart review, face to face patient visit, review of tests, documentation and/or discussion with other providers about the issues documented above.    Abdirahman Smith MD  Professor, OB/GYN  Urogynecologist  CC  Patient Care Team:  Rafaela Escobar MD as " PCP - General (Family Medicine)  Gi Gotti MD as MD (Urology)  Teena Viveros NP as Referring Physician  Ilia Fulton MD as Assigned Surgical Provider  Abdirahman Smith MD as MD (OB/Gyn)  Yashira Marion MD as Referring Physician (Hospice And Palliative Care)  YASHIRA MARION                Error      Again, thank you for allowing me to participate in the care of your patient.      Sincerely,    Abdirahman Smith MD

## 2022-07-22 NOTE — NURSING NOTE
"Chief Complaint   Patient presents with     Consult     Incontinence       Blood pressure (!) 143/80, pulse 75, height 1.664 m (5' 5.5\"), weight 101.2 kg (223 lb). Body mass index is 36.54 kg/m .    Patient Active Problem List   Diagnosis     Trochanteric bursitis       Allergies   Allergen Reactions     Sulfa Drugs Other (See Comments) and Rash     Face swells       Iodine Rash     Oxybutynin Other (See Comments)     Severe dry mouth     Mirabegron      Other reaction(s): Dizziness       Current Outpatient Medications   Medication Sig Dispense Refill     clonazePAM (KLONOPIN) 0.5 MG tablet        clonazePAM (KLONOPIN) 0.5 MG tablet        alendronate (FOSAMAX) 70 MG tablet Take 70 mg by mouth       alendronate (FOSAMAX) 70 MG tablet        amLODIPine (NORVASC) 2.5 MG tablet        amLODIPine (NORVASC) 2.5 MG tablet Take 2.5 mg by mouth daily       cholecalciferol 25 MCG (1000 UT) TABS Take 1,000 Units by mouth       cycloSPORINE (RESTASIS) 0.05 % ophthalmic emulsion 1 drop (Patient not taking: Reported on 5/25/2022)       PULMICORT FLEXHALER 180 MCG/ACT inhaler        RESTASIS 0.05 % ophthalmic emulsion  (Patient not taking: Reported on 5/25/2022)       SYMBICORT 80-4.5 MCG/ACT Inhaler          Social History     Tobacco Use     Smoking status: Never Smoker     Smokeless tobacco: Never Used   Substance Use Topics     Alcohol use: Not Currently       Jenifersantino Chapa  7/22/2022  2:51 PM  "

## 2022-08-23 ENCOUNTER — MYC MEDICAL ADVICE (OUTPATIENT)
Dept: UROLOGY | Facility: CLINIC | Age: 87
End: 2022-08-23

## 2022-08-23 DIAGNOSIS — N30.00 ACUTE CYSTITIS WITHOUT HEMATURIA: Primary | ICD-10-CM

## 2022-08-24 ENCOUNTER — LAB (OUTPATIENT)
Dept: LAB | Facility: CLINIC | Age: 87
End: 2022-08-24
Payer: MEDICARE

## 2022-08-24 DIAGNOSIS — N30.00 ACUTE CYSTITIS WITHOUT HEMATURIA: ICD-10-CM

## 2022-08-24 LAB
ALBUMIN UR-MCNC: ABNORMAL MG/DL
APPEARANCE UR: CLEAR
BACTERIA #/AREA URNS HPF: ABNORMAL /HPF
BILIRUB UR QL STRIP: NEGATIVE
COLOR UR AUTO: YELLOW
GLUCOSE UR STRIP-MCNC: NEGATIVE MG/DL
HGB UR QL STRIP: ABNORMAL
KETONES UR STRIP-MCNC: NEGATIVE MG/DL
LEUKOCYTE ESTERASE UR QL STRIP: ABNORMAL
NITRATE UR QL: NEGATIVE
PH UR STRIP: 6 [PH] (ref 5–8)
RBC #/AREA URNS AUTO: ABNORMAL /HPF
SP GR UR STRIP: 1.02 (ref 1–1.03)
SQUAMOUS #/AREA URNS AUTO: ABNORMAL /LPF
UROBILINOGEN UR STRIP-ACNC: 0.2 E.U./DL
WBC #/AREA URNS AUTO: ABNORMAL /HPF

## 2022-08-24 PROCEDURE — 81001 URINALYSIS AUTO W/SCOPE: CPT

## 2022-08-24 PROCEDURE — 87086 URINE CULTURE/COLONY COUNT: CPT

## 2022-08-26 LAB — BACTERIA UR CULT: NORMAL

## 2022-08-30 ENCOUNTER — OFFICE VISIT (OUTPATIENT)
Dept: DERMATOLOGY | Facility: CLINIC | Age: 87
End: 2022-08-30
Payer: MEDICARE

## 2022-08-30 DIAGNOSIS — L90.5 SCAR: Primary | ICD-10-CM

## 2022-08-30 DIAGNOSIS — Z48.89 ENCOUNTER FOR POSTOPERATIVE WOUND CHECK: ICD-10-CM

## 2022-08-30 PROCEDURE — 11900 INJECT SKIN LESIONS </W 7: CPT | Performed by: DERMATOLOGY

## 2022-08-30 RX ORDER — TRIAMCINOLONE ACETONIDE 40 MG/ML
4 INJECTION, SUSPENSION INTRA-ARTICULAR; INTRAMUSCULAR ONCE
Status: COMPLETED | OUTPATIENT
Start: 2022-08-30 | End: 2022-08-30

## 2022-08-30 RX ADMIN — TRIAMCINOLONE ACETONIDE 4 MG: 40 INJECTION, SUSPENSION INTRA-ARTICULAR; INTRAMUSCULAR at 13:58

## 2022-08-30 ASSESSMENT — PAIN SCALES - GENERAL: PAINLEVEL: NO PAIN (1)

## 2022-08-30 NOTE — LETTER
8/30/2022       RE: Sera Diaz  [address]  [place] [address]     Dear Colleague,    Thank you for referring your patient, Sera Diaz, to the [facility] DERMATOLOGIC SURGERY CLINIC [place] at [facility]. Please see a copy of my visit note below.    Dermatologic Surgery Clinic Note    Aug 30, 2022    Dermatology Problem List:  # NMSC  - BCC, dorsum of nose, s/p Mohs 5/25/2022  - BCC, left temple, s/p Mohs 1/12/2022    Subjective: The patient is a 86 year old woman who presents today for a wound check after recent dermatologic surgery. No concerns for infection today. The patient continues with daily wound cares as recommended.    Healing has been overall uncomplicated. Does report fullness with associated redness and pain along L nasal side wall x 1-2 weeks. Otherwise happy with scar appearance.    No other associated symptoms, modifying factors, or prior treatments, except when noted above. The patient denies any constitutional symptoms, lymphadenopathy, unintentional weight loss or decreased appetite. No other skin concerns today.    Objective:   Gen: This is a well appearing individual in no acute distress. The patient is alert and oriented x 3.  An exam of the nose and L temple was performed today and visualized the following:  - Well healed curvilinear scar on nasal dorsum and L sidewall. Focal point of induration and erythema at flap rotation point on L sidewall  - Well-healed, flat scar on L temple    Assessment and Plan:     # BCC, left dorsum of nose, s/p Mohs 5/25/2022 with suture reaction.  - The patient's surgery site(s) is/are healing very well. No evidence of infection on examination today.  - Likely suture granuloma on L nasal sidewall. Treated with ILK-20 today. See procedure below  - Start scar massage to area 4x daily in 2 weeks  - The patient should follow up with dermatologic surgery PRN, as well as continue with  regular skin exams in general dermatology clinic.     ILK Procedure:  - Intra-lesional triamcinolone procedure note. After verbal consent and review of risk of pain and skin thinning/atrophy, positioning and cleansing with isopropyl alcohol, 0.2 total mL of triamcinolone 20 mg/mL was injected into 1 lesion(s) on the nose. The patient tolerated the procedure well and left the dermatology clinic in good condition.    The patient was discussed with and evaluated by attending physician, Ilia Fulton MD.    Syed Newton MD  Dermatology, PGY-5  Mohs surgery fellow    Scribe Disclosure:  I, Trey Gray, am serving as a scribe to document services personally performed by Ilia Fulton MD based on data collection and the provider's statements to me.       Attending attestation:  I was present for key elements of the procedure and immediately available for all other portions of the procedure.  I have reviewed the note and edited it as necessary.    Ilia Fulton M.D.  Professor  Director of Dermatologic Surgery  Department of Dermatology  [facility]    Dermatology Surgery Clinic  [facility] [place] [address]        Drug Administration Record    Prior to injection, verified patient identity using patient's name and date of birth.  Due to injection administration, patient instructed to remain in clinic for 15 minutes  afterwards, and to report any adverse reaction to me immediately.    Drug Name: Triamcinolone Acetonide  Dose: 0.1 mL  Route administered: SQ  NDC #: [address]-0  Amount of waste(mL): 0.9 mL  Reason for waste: Not needed/Dose    LOT #: [id number]  SITE: Nose  : NetEffect   EXPIRATION DATE: 01/2023      Drug Administration Record    Prior to injection, verified patient identity using patient's name and date of birth.  Due to injection administration, patient instructed to remain in clinic for 15 minutes   afterwards, and to report any adverse reaction to me immediately.    Drug Name: 0.9% Sodium Chloride  Dose: 0.1 mL  Route administered: SQ  NDC #: 0898-7719-44  Amount of waste(mL): 9 mL  Reason for waste: Not needed/Dose    LOT #: -[id number]  SITE: Nose  : Hospira  EXPIRATION DATE: November 1st, 2023    Judy DUMONT RN

## 2022-08-30 NOTE — PROGRESS NOTES
Drug Administration Record    Prior to injection, verified patient identity using patient's name and date of birth.  Due to injection administration, patient instructed to remain in clinic for 15 minutes  afterwards, and to report any adverse reaction to me immediately.    Drug Name: Triamcinolone Acetonide  Dose: 0.1 mL  Route administered: SQ  NDC #: [address]-1  Amount of waste(mL): 0.9 mL  Reason for waste: Not needed/Dose    LOT #: [id number]  SITE: Nose  : Proenza Schouer   EXPIRATION DATE: 01/2023      Drug Administration Record    Prior to injection, verified patient identity using patient's name and date of birth.  Due to injection administration, patient instructed to remain in clinic for 15 minutes  afterwards, and to report any adverse reaction to me immediately.    Drug Name: 0.9% Sodium Chloride  Dose: 0.1 mL  Route administered: SQ  NDC #: 4327-4471-19  Amount of waste(mL): 9 mL  Reason for waste: Not needed/Dose    LOT #: -[id number]  SITE: Nose  : Hospira  EXPIRATION DATE: November 1st, 2023    Judy DUMONT RN

## 2022-08-30 NOTE — PROGRESS NOTES
Dermatologic Surgery Clinic Note    Aug 30, 2022    Dermatology Problem List:  # NMSC  - BCC, dorsum of nose, s/p Mohs 5/25/2022  - BCC, left temple, s/p Mohs 1/12/2022    Subjective: The patient is a 86 year old woman who presents today for a wound check after recent dermatologic surgery. No concerns for infection today. The patient continues with daily wound cares as recommended.    Healing has been overall uncomplicated. Does report fullness with associated redness and pain along L nasal side wall x 1-2 weeks. Otherwise happy with scar appearance.    No other associated symptoms, modifying factors, or prior treatments, except when noted above. The patient denies any constitutional symptoms, lymphadenopathy, unintentional weight loss or decreased appetite. No other skin concerns today.    Objective:   Gen: This is a well appearing individual in no acute distress. The patient is alert and oriented x 3.  An exam of the nose and L temple was performed today and visualized the following:  - Well healed curvilinear scar on nasal dorsum and L sidewall. Focal point of induration and erythema at flap rotation point on L sidewall  - Well-healed, flat scar on L temple    Assessment and Plan:     # BCC, left dorsum of nose, s/p Mohs 5/25/2022 with suture reaction.  - The patient's surgery site(s) is/are healing very well. No evidence of infection on examination today.  - Likely suture granuloma on L nasal sidewall. Treated with ILK-20 today. See procedure below  - Start scar massage to area 4x daily in 2 weeks  - The patient should follow up with dermatologic surgery PRN, as well as continue with regular skin exams in general dermatology clinic.     ILK Procedure:  - Intra-lesional triamcinolone procedure note. After verbal consent and review of risk of pain and skin thinning/atrophy, positioning and cleansing with isopropyl alcohol, 0.2 total mL of triamcinolone 20 mg/mL was injected into 1 lesion(s) on the nose. The  patient tolerated the procedure well and left the dermatology clinic in good condition.    The patient was discussed with and evaluated by attending physician, Ilia Fulton MD.    Syed Newton MD  Dermatology, PGY-5  Mohs surgery fellow    Scribe Disclosure:  I, Trey Gray, am serving as a scribe to document services personally performed by Ilia Fulton MD based on data collection and the provider's statements to me.       Attending attestation:  I was present for key elements of the procedure and immediately available for all other portions of the procedure.  I have reviewed the note and edited it as necessary.    Ilia Fulton M.D.  Professor  Director of Dermatologic Surgery  Department of Dermatology  [facility]    Dermatology Surgery Clinic  [facility] and [facility] [place] [id number]

## 2022-08-30 NOTE — NURSING NOTE
Chief Complaint   Patient presents with     Derm Problem     Patient is here today for a 3 month scar evaluation regarding the nose.     Judy DUMONT RN

## 2022-09-15 ENCOUNTER — PRE VISIT (OUTPATIENT)
Dept: UROLOGY | Facility: CLINIC | Age: 87
End: 2022-09-15

## 2022-09-15 NOTE — TELEPHONE ENCOUNTER
Reason for visit: Follow up      Relevant information: Urge urinary incontinence    Records/imaging/labs/orders: All records available    Pt called: N/A    At Rooming: Standard

## 2022-09-23 ENCOUNTER — OFFICE VISIT (OUTPATIENT)
Dept: UROLOGY | Facility: CLINIC | Age: 87
End: 2022-09-23
Payer: MEDICARE

## 2022-09-23 VITALS
HEIGHT: 65 IN | DIASTOLIC BLOOD PRESSURE: 77 MMHG | WEIGHT: 225 LBS | HEART RATE: 80 BPM | SYSTOLIC BLOOD PRESSURE: 130 MMHG | BODY MASS INDEX: 37.49 KG/M2

## 2022-09-23 DIAGNOSIS — N39.46 MIXED INCONTINENCE URGE AND STRESS: Primary | ICD-10-CM

## 2022-09-23 PROCEDURE — 99214 OFFICE O/P EST MOD 30 MIN: CPT | Performed by: OBSTETRICS & GYNECOLOGY

## 2022-09-23 ASSESSMENT — PAIN SCALES - GENERAL: PAINLEVEL: NO PAIN (0)

## 2022-09-23 NOTE — PATIENT INSTRUCTIONS
Please get scheduled for twelve consecutive weeks of PTNS nurse visits.    It was a pleasure meeting with you today.  Thank you for allowing me and my team the privilege of caring for you today.  YOU are the reason we are here, and I truly hope we provided you with the excellent service you deserve.  Please let us know if there is anything else we can do for you so that we can be sure you are leaving completely satisfied with your care experience.

## 2022-09-23 NOTE — NURSING NOTE
"Chief Complaint   Patient presents with     Follow Up       Blood pressure 130/77, pulse 80, height 1.651 m (5' 5\"), weight 102.1 kg (225 lb). Body mass index is 37.44 kg/m .    Patient Active Problem List   Diagnosis     Trochanteric bursitis       Allergies   Allergen Reactions     Sulfa Drugs Other (See Comments) and Rash     Face swells       Iodine Rash     Oxybutynin Other (See Comments)     Severe dry mouth     Mirabegron      Other reaction(s): Dizziness       Current Outpatient Medications   Medication Sig Dispense Refill     alendronate (FOSAMAX) 70 MG tablet Take 70 mg by mouth       alendronate (FOSAMAX) 70 MG tablet        amLODIPine (NORVASC) 2.5 MG tablet        amLODIPine (NORVASC) 2.5 MG tablet Take 2.5 mg by mouth daily       BETAMETHASONE PO        cholecalciferol 25 MCG (1000 UT) TABS Take 1,000 Units by mouth       clonazePAM (KLONOPIN) 0.5 MG tablet        clonazePAM (KLONOPIN) 0.5 MG tablet        CVS MUCUS EXTENDED RELEASE 600 MG 12 hr tablet TAKE 1 TABLET (600 MG) BY MOUTH TWICE DAILY AS NEEDED (PHLEGM).       cycloSPORINE (RESTASIS) 0.05 % ophthalmic emulsion 1 drop (Patient not taking: No sig reported)       diazepam (VALIUM) 2 MG tablet Take by mouth every 6 hours as needed for anxiety       docosanol (ABREVA) 10 % CREA cream Apply topically 5 times daily       fluticasone (FLONASE) 50 MCG/ACT nasal spray Spray 2 sprays in nostril       levalbuterol (XOPENEX HFA) 45 MCG/ACT inhaler INHALE 2 PUFFS EVERY FOUR HOURS AS NEEDED FOR SHORTNESS OF BREATH. (Patient not taking: No sig reported)       montelukast (SINGULAIR) 10 MG tablet Take 10 mg by mouth At Bedtime       nystatin (MYCOSTATIN) 944319 UNIT/GM external cream APPLY TO AFFECTED AREA TWICE DAILY UNTIL HEALED.       omeprazole (PRILOSEC) 20 MG DR capsule TAKE 1 CAPSULE BY MOUTH EVERY DAY       predniSONE (DELTASONE) 5 MG tablet 2 tabs po tid x 2 days then 2 tabs po bid x 2 days then 2 tabs po daily x 2 days then 1 tab po daily x 1 day " prn asthma flare       Propylene Glycol (SYSTANE COMPLETE OP)        PULMICORT FLEXHALER 180 MCG/ACT inhaler        RESTASIS 0.05 % ophthalmic emulsion  (Patient not taking: No sig reported)       simvastatin (ZOCOR) 10 MG tablet Take 10 mg by mouth At Bedtime       SYMBICORT 80-4.5 MCG/ACT Inhaler  (Patient not taking: No sig reported)       tolterodine (DETROL) 1 MG tablet Take 1 mg by mouth daily       tolterodine ER (DETROL LA) 4 MG 24 hr capsule Take 1 capsule (4 mg) by mouth daily 30 capsule 3     triamterene-HCTZ (DYAZIDE) 37.5-25 MG capsule TAKE 1 CAPSULE BY MOUTH EVERY MORNING ORALLY ONCE A DAY       UNABLE TO FIND MEDICATION NAME: presser vision vitamin - for eyes       valACYclovir (VALTREX) 1000 mg tablet          Social History     Tobacco Use     Smoking status: Never Smoker     Smokeless tobacco: Never Used   Substance Use Topics     Alcohol use: Not Currently       Aaliyah Frias, BERNA  9/23/2022  2:05 PM

## 2022-09-23 NOTE — LETTER
"9/23/2022       RE: Sera Diaz  [address]  [place] [address]     Dear Colleague,    Thank you for referring your patient, Sera Diaz, to the [facility] UROLOGY CLINIC [place] at [facility]. Please see a copy of my visit note below.    September 23, 2022    Return visit    Patient returns today for f/u of OAB. Since her last visit she has tried detrol without improvement in her symptoms. SHe has also tried oxybutynin and myrbetriq without improvement. She wants to talk about other options.    /77   Pulse 80   Ht 1.651 m (5' 5\")   Wt 102.1 kg (225 lb)   BMI 37.44 kg/m    She is comfortable, in no distress, non-labored breathing.    A/P: 86 year old F with OAB    Has tried 3 different meds without improvement in her symptoms. We discussed BOTOX, SNM and PTNS. Will set pt up with PTNS.    I spent a total of 30 minutes with  Ms. Diaz  on the date of the encounter in chart review, face to face patient visit, review of tests, documentation and/or discussion with other providers about the issues documented above.    Abdirahman Smith MD  Professor, OB/GYN  Urogynecologist    CC  Patient Care Team:  Rafaela Escobar MD as PCP - General (Family Medicine)  Gi Gotti MD as MD (Urology)  Teena Viveros NP as Referring Physician  Ilia Fulton MD as Assigned Surgical Provider  Abdirahman Smith MD as MD (OB/Gyn)  Yashira Payan MD as Referring Physician (Hospice And Palliative Care)      "

## 2022-09-23 NOTE — PROGRESS NOTES
"September 23, 2022    Return visit    Patient returns today for f/u of OAB. Since her last visit she has tried detrol without improvement in her symptoms. SHe has also tried oxybutynin and myrbetriq without improvement. She wants to talk about other options.    /77   Pulse 80   Ht 1.651 m (5' 5\")   Wt 102.1 kg (225 lb)   BMI 37.44 kg/m    She is comfortable, in no distress, non-labored breathing.    A/P: 86 year old F with OAB    Has tried 3 different meds without improvement in her symptoms. We discussed BOTOX, SNM and PTNS. Will set pt up with PTNS.    I spent a total of 30 minutes with  Ms. Diaz  on the date of the encounter in chart review, face to face patient visit, review of tests, documentation and/or discussion with other providers about the issues documented above.    Abdirahman Smith MD  Professor, OB/GYN  Urogynecologist    CC  Patient Care Team:  Rafaela Escobar MD as PCP - General (Family Medicine)  Gi Gotti MD as MD (Urology)  Teena Viveros NP as Referring Physician  Ilia Fulton MD as Assigned Surgical Provider  Abdirahman Smith MD as MD (OB/Gyn)  Yashira Payan MD as Referring Physician (Hospice And Palliative Care)  Abdirahman Smith MD as Assigned OBGYN Provider  SELF, REFERRED    "

## 2022-10-05 ENCOUNTER — TELEPHONE (OUTPATIENT)
Dept: UROLOGY | Facility: CLINIC | Age: 87
End: 2022-10-05

## 2022-10-05 NOTE — TELEPHONE ENCOUNTER
[facility] Call Center    Phone Message    May a detailed message be left on voicemail: yes     Reason for Call: Appointment Intake    Referring Provider Name: SmithAbdirahman  Diagnosis and/or Symptoms: 12 consecutive weeks of PTNS per 9/23/22 visit notes. Please contact patient to schedule. Thank you.    Action Taken: Message routed to:  [facility] Urology    Travel Screening: Not Applicable

## 2022-10-18 ENCOUNTER — OFFICE VISIT (OUTPATIENT)
Dept: UROLOGY | Facility: CLINIC | Age: 87
End: 2022-10-18
Payer: MEDICARE

## 2022-10-18 DIAGNOSIS — N39.41 URGE INCONTINENCE: ICD-10-CM

## 2022-10-18 DIAGNOSIS — N39.46 MIXED INCONTINENCE URGE AND STRESS: Primary | ICD-10-CM

## 2022-10-18 PROCEDURE — 64566 NEUROELTRD STIM POST TIBIAL: CPT

## 2022-10-18 NOTE — PROGRESS NOTES
Chief Complaint   Patient presents with     Allied Health Visit     PTNS #1       Patient Active Problem List   Diagnosis     Trochanteric bursitis       Allergies   Allergen Reactions     Sulfa Drugs Other (See Comments) and Rash     Face swells       Iodine Rash     Oxybutynin Other (See Comments)     Severe dry mouth     Mirabegron      Other reaction(s): Dizziness       Current Outpatient Medications   Medication Sig Dispense Refill     alendronate (FOSAMAX) 70 MG tablet Take 70 mg by mouth       alendronate (FOSAMAX) 70 MG tablet        amLODIPine (NORVASC) 2.5 MG tablet        amLODIPine (NORVASC) 2.5 MG tablet Take 2.5 mg by mouth daily       BETAMETHASONE PO        cholecalciferol 25 MCG (1000 UT) TABS Take 1,000 Units by mouth       clonazePAM (KLONOPIN) 0.5 MG tablet        clonazePAM (KLONOPIN) 0.5 MG tablet        [facility] MUCUS EXTENDED RELEASE 600 MG 12 hr tablet TAKE 1 TABLET (600 MG) BY MOUTH TWICE DAILY AS NEEDED (PHLEGM).       cycloSPORINE (RESTASIS) 0.05 % ophthalmic emulsion 1 drop (Patient not taking: No sig reported)       diazepam (VALIUM) 2 MG tablet Take by mouth every 6 hours as needed for anxiety       docosanol (ABREVA) 10 % CREA cream Apply topically 5 times daily       fluticasone (FLONASE) 50 MCG/ACT nasal spray Spray 2 sprays in nostril       levalbuterol (XOPENEX HFA) 45 MCG/ACT inhaler INHALE 2 PUFFS EVERY FOUR HOURS AS NEEDED FOR SHORTNESS OF BREATH. (Patient not taking: No sig reported)       montelukast (SINGULAIR) 10 MG tablet Take 10 mg by mouth At Bedtime       nystatin (MYCOSTATIN) 550429 UNIT/GM external cream APPLY TO AFFECTED AREA TWICE DAILY UNTIL HEALED.       omeprazole (PRILOSEC) 20 MG DR capsule TAKE 1 CAPSULE BY MOUTH EVERY DAY       predniSONE (DELTASONE) 5 MG tablet 2 tabs po tid x 2 days then 2 tabs po bid x 2 days then 2 tabs po daily x 2 days then 1 tab po daily x 1 day prn asthma flare       Propylene Glycol (SYSTANE COMPLETE OP)        PULMICORT FLEXHALER 180  MCG/ACT inhaler        RESTASIS 0.05 % ophthalmic emulsion  (Patient not taking: No sig reported)       simvastatin (ZOCOR) 10 MG tablet Take 10 mg by mouth At Bedtime       SYMBICORT 80-4.5 MCG/ACT Inhaler  (Patient not taking: No sig reported)       tolterodine (DETROL) 1 MG tablet Take 1 mg by mouth daily       tolterodine ER (DETROL LA) 4 MG 24 hr capsule Take 1 capsule (4 mg) by mouth daily 30 capsule 3     triamterene-HCTZ (DYAZIDE) 37.5-25 MG capsule TAKE 1 CAPSULE BY MOUTH EVERY MORNING ORALLY ONCE A DAY       UNABLE TO FIND MEDICATION NAME: presser vision vitamin - for eyes       valACYclovir (VALTREX) 1000 mg tablet          Social History     Tobacco Use     Smoking status: Never     Smokeless tobacco: Never   Substance Use Topics     Alcohol use: Not Currently       Sera Diaz comes into clinic today at the request of Abdirahman Smith for PTNS treatment.    This service provided today was under the direct supervision of Dr. Faye, who was available if needed.    Percutaneous Tibial Nerve Stimulation (PTNS)    Treatment number : 1    Percutaneous tibial nerve stimulation (PTNS) was prescribed for Sera Diaz's Overactive Bladder symptoms of urge incontinence. The needle electrode was inserted into the lower, inner aspect of the left leg. The surface electrode was placed on the inside arch of the foot on the treatment leg. The lead set was connected to the stimulator, and the needle electrode clip was connected to the needle electrode. The stimulator that produces an adjustable electrical pulse that travels to the sacral nerve plexus via the tibial nerve was adjusted to a setting of 15 with good toe curl noted. The voiding diary or patient's symptoms were reviewed prior to the start of treatment.    Armando Barney EMT  10/18/2022  2:11 PM

## 2022-10-25 ENCOUNTER — OFFICE VISIT (OUTPATIENT)
Dept: UROLOGY | Facility: CLINIC | Age: 87
End: 2022-10-25
Payer: MEDICARE

## 2022-10-25 DIAGNOSIS — N39.41 URGE INCONTINENCE: Primary | ICD-10-CM

## 2022-10-25 PROCEDURE — 64566 NEUROELTRD STIM POST TIBIAL: CPT

## 2022-10-25 NOTE — PROGRESS NOTES
Chief Complaint   Patient presents with     Allied Health Visit     PTNS       Patient Active Problem List   Diagnosis     Trochanteric bursitis       Allergies   Allergen Reactions     Sulfa Drugs Other (See Comments) and Rash     Face swells       Iodine Rash     Oxybutynin Other (See Comments)     Severe dry mouth     Mirabegron      Other reaction(s): Dizziness       Current Outpatient Medications   Medication Sig Dispense Refill     alendronate (FOSAMAX) 70 MG tablet Take 70 mg by mouth       alendronate (FOSAMAX) 70 MG tablet        amLODIPine (NORVASC) 2.5 MG tablet        amLODIPine (NORVASC) 2.5 MG tablet Take 2.5 mg by mouth daily       BETAMETHASONE PO        cholecalciferol 25 MCG (1000 UT) TABS Take 1,000 Units by mouth       clonazePAM (KLONOPIN) 0.5 MG tablet        clonazePAM (KLONOPIN) 0.5 MG tablet        [facility] MUCUS EXTENDED RELEASE 600 MG 12 hr tablet TAKE 1 TABLET (600 MG) BY MOUTH TWICE DAILY AS NEEDED (PHLEGM).       cycloSPORINE (RESTASIS) 0.05 % ophthalmic emulsion 1 drop (Patient not taking: No sig reported)       diazepam (VALIUM) 2 MG tablet Take by mouth every 6 hours as needed for anxiety       docosanol (ABREVA) 10 % CREA cream Apply topically 5 times daily       fluticasone (FLONASE) 50 MCG/ACT nasal spray Spray 2 sprays in nostril       levalbuterol (XOPENEX HFA) 45 MCG/ACT inhaler INHALE 2 PUFFS EVERY FOUR HOURS AS NEEDED FOR SHORTNESS OF BREATH. (Patient not taking: No sig reported)       montelukast (SINGULAIR) 10 MG tablet Take 10 mg by mouth At Bedtime       nystatin (MYCOSTATIN) 343564 UNIT/GM external cream APPLY TO AFFECTED AREA TWICE DAILY UNTIL HEALED.       omeprazole (PRILOSEC) 20 MG DR capsule TAKE 1 CAPSULE BY MOUTH EVERY DAY       predniSONE (DELTASONE) 5 MG tablet 2 tabs po tid x 2 days then 2 tabs po bid x 2 days then 2 tabs po daily x 2 days then 1 tab po daily x 1 day prn asthma flare       Propylene Glycol (SYSTANE COMPLETE OP)        PULMICORT FLEXHALER 180 MCG/ACT  inhaler        RESTASIS 0.05 % ophthalmic emulsion  (Patient not taking: No sig reported)       simvastatin (ZOCOR) 10 MG tablet Take 10 mg by mouth At Bedtime       SYMBICORT 80-4.5 MCG/ACT Inhaler  (Patient not taking: No sig reported)       tolterodine (DETROL) 1 MG tablet Take 1 mg by mouth daily       tolterodine ER (DETROL LA) 4 MG 24 hr capsule Take 1 capsule (4 mg) by mouth daily 30 capsule 3     triamterene-HCTZ (DYAZIDE) 37.5-25 MG capsule TAKE 1 CAPSULE BY MOUTH EVERY MORNING ORALLY ONCE A DAY       UNABLE TO FIND MEDICATION NAME: presser vision vitamin - for eyes       valACYclovir (VALTREX) 1000 mg tablet          Social History     Tobacco Use     Smoking status: Never     Smokeless tobacco: Never   Substance Use Topics     Alcohol use: Not Currently       Sera Diaz comes into clinic today at the request of Abdirahman Smith for PTNS treatment.    This service provided today was under the direct supervision of Dr. Faye, who was available if needed.    Percutaneous Tibial Nerve Stimulation (PTNS)    Treatment number : 2    Percutaneous tibial nerve stimulation (PTNS) was prescribed for Sera Diaz's Overactive Bladder symptoms of urge incontinence. The needle electrode was inserted into the lower, inner aspect of the left leg. The surface electrode was placed on the inside arch of the foot on the treatment leg. The lead set was connected to the stimulator, and the needle electrode clip was connected to the needle electrode. The stimulator that produces an adjustable electrical pulse that travels to the sacral nerve plexus via the tibial nerve was adjusted to a setting of 19 with good toe curl noted. The voiding diary or patient's symptoms were reviewed prior to the start of treatment. The patient experienced no changes since the last treatment.    Armando Barney EMT  10/25/2022  2:47 PM

## 2022-10-31 ENCOUNTER — OFFICE VISIT (OUTPATIENT)
Dept: UROLOGY | Facility: CLINIC | Age: 87
End: 2022-10-31
Payer: MEDICARE

## 2022-10-31 DIAGNOSIS — N39.41 URGE INCONTINENCE: Primary | ICD-10-CM

## 2022-10-31 PROCEDURE — 64566 NEUROELTRD STIM POST TIBIAL: CPT

## 2022-10-31 NOTE — PROGRESS NOTES
Chief Complaint   Patient presents with     Allied Health Visit     PTNS #3       Patient Active Problem List   Diagnosis     Trochanteric bursitis       Allergies   Allergen Reactions     Sulfa Drugs Other (See Comments) and Rash     Face swells       Iodine Rash     Oxybutynin Other (See Comments)     Severe dry mouth     Mirabegron      Other reaction(s): Dizziness       Current Outpatient Medications   Medication Sig Dispense Refill     alendronate (FOSAMAX) 70 MG tablet Take 70 mg by mouth       alendronate (FOSAMAX) 70 MG tablet        amLODIPine (NORVASC) 2.5 MG tablet        amLODIPine (NORVASC) 2.5 MG tablet Take 2.5 mg by mouth daily       BETAMETHASONE PO        cholecalciferol 25 MCG (1000 UT) TABS Take 1,000 Units by mouth       clonazePAM (KLONOPIN) 0.5 MG tablet        clonazePAM (KLONOPIN) 0.5 MG tablet        [facility] MUCUS EXTENDED RELEASE 600 MG 12 hr tablet TAKE 1 TABLET (600 MG) BY MOUTH TWICE DAILY AS NEEDED (PHLEGM).       cycloSPORINE (RESTASIS) 0.05 % ophthalmic emulsion 1 drop (Patient not taking: No sig reported)       diazepam (VALIUM) 2 MG tablet Take by mouth every 6 hours as needed for anxiety       docosanol (ABREVA) 10 % CREA cream Apply topically 5 times daily       fluticasone (FLONASE) 50 MCG/ACT nasal spray Spray 2 sprays in nostril       levalbuterol (XOPENEX HFA) 45 MCG/ACT inhaler INHALE 2 PUFFS EVERY FOUR HOURS AS NEEDED FOR SHORTNESS OF BREATH. (Patient not taking: No sig reported)       montelukast (SINGULAIR) 10 MG tablet Take 10 mg by mouth At Bedtime       nystatin (MYCOSTATIN) 603021 UNIT/GM external cream APPLY TO AFFECTED AREA TWICE DAILY UNTIL HEALED.       omeprazole (PRILOSEC) 20 MG DR capsule TAKE 1 CAPSULE BY MOUTH EVERY DAY       predniSONE (DELTASONE) 5 MG tablet 2 tabs po tid x 2 days then 2 tabs po bid x 2 days then 2 tabs po daily x 2 days then 1 tab po daily x 1 day prn asthma flare       Propylene Glycol (SYSTANE COMPLETE OP)        PULMICORT FLEXHALER 180  MCG/ACT inhaler        RESTASIS 0.05 % ophthalmic emulsion  (Patient not taking: No sig reported)       simvastatin (ZOCOR) 10 MG tablet Take 10 mg by mouth At Bedtime       SYMBICORT 80-4.5 MCG/ACT Inhaler  (Patient not taking: No sig reported)       tolterodine (DETROL) 1 MG tablet Take 1 mg by mouth daily       tolterodine ER (DETROL LA) 4 MG 24 hr capsule Take 1 capsule (4 mg) by mouth daily 30 capsule 3     triamterene-HCTZ (DYAZIDE) 37.5-25 MG capsule TAKE 1 CAPSULE BY MOUTH EVERY MORNING ORALLY ONCE A DAY       UNABLE TO FIND MEDICATION NAME: presser vision vitamin - for eyes       valACYclovir (VALTREX) 1000 mg tablet          Social History     Tobacco Use     Smoking status: Never     Smokeless tobacco: Never   Substance Use Topics     Alcohol use: Not Currently       Sera Diaz comes into clinic today at the request of Abdirahman Smith for PTNS treatment.    This service provided today was under the direct supervision of Dr. Christianson, who was available if needed.    Percutaneous Tibial Nerve Stimulation (PTNS)    Treatment number : 3    Percutaneous tibial nerve stimulation (PTNS) was prescribed for Sera Diaz's Overactive Bladder symptoms of urge incontinence. The needle electrode was inserted into the lower, inner aspect of the left leg. The surface electrode was placed on the inside arch of the foot on the treatment leg. The lead set was connected to the stimulator, and the needle electrode clip was connected to the needle electrode. The stimulator that produces an adjustable electrical pulse that travels to the sacral nerve plexus via the tibial nerve was adjusted to a setting of 7 with good toe curl noted. The voiding diary or patient's symptoms were reviewed prior to the start of treatment. The patient experienced no changes since the last treatment.    Armando Barney EMT  10/31/2022  2:15 PM

## 2022-11-08 ENCOUNTER — OFFICE VISIT (OUTPATIENT)
Dept: UROLOGY | Facility: CLINIC | Age: 87
End: 2022-11-08
Payer: MEDICARE

## 2022-11-08 DIAGNOSIS — N39.41 URGE INCONTINENCE: Primary | ICD-10-CM

## 2022-11-08 PROCEDURE — 64566 NEUROELTRD STIM POST TIBIAL: CPT

## 2022-11-08 NOTE — PROGRESS NOTES
No chief complaint on file.      Patient Active Problem List   Diagnosis     Trochanteric bursitis       Allergies   Allergen Reactions     Sulfa Drugs Other (See Comments) and Rash     Face swells       Iodine Rash     Oxybutynin Other (See Comments)     Severe dry mouth     Mirabegron      Other reaction(s): Dizziness       Current Outpatient Medications   Medication Sig Dispense Refill     alendronate (FOSAMAX) 70 MG tablet Take 70 mg by mouth       alendronate (FOSAMAX) 70 MG tablet        amLODIPine (NORVASC) 2.5 MG tablet        amLODIPine (NORVASC) 2.5 MG tablet Take 2.5 mg by mouth daily       BETAMETHASONE PO        cholecalciferol 25 MCG (1000 UT) TABS Take 1,000 Units by mouth       clonazePAM (KLONOPIN) 0.5 MG tablet        clonazePAM (KLONOPIN) 0.5 MG tablet        [facility] MUCUS EXTENDED RELEASE 600 MG 12 hr tablet TAKE 1 TABLET (600 MG) BY MOUTH TWICE DAILY AS NEEDED (PHLEGM).       cycloSPORINE (RESTASIS) 0.05 % ophthalmic emulsion 1 drop (Patient not taking: No sig reported)       diazepam (VALIUM) 2 MG tablet Take by mouth every 6 hours as needed for anxiety       docosanol (ABREVA) 10 % CREA cream Apply topically 5 times daily       fluticasone (FLONASE) 50 MCG/ACT nasal spray Spray 2 sprays in nostril       levalbuterol (XOPENEX HFA) 45 MCG/ACT inhaler INHALE 2 PUFFS EVERY FOUR HOURS AS NEEDED FOR SHORTNESS OF BREATH. (Patient not taking: No sig reported)       montelukast (SINGULAIR) 10 MG tablet Take 10 mg by mouth At Bedtime       nystatin (MYCOSTATIN) [id number] UNIT/GM external cream APPLY TO AFFECTED AREA TWICE DAILY UNTIL HEALED.       omeprazole (PRILOSEC) 20 MG DR capsule TAKE 1 CAPSULE BY MOUTH EVERY DAY       predniSONE (DELTASONE) 5 MG tablet 2 tabs po tid x 2 days then 2 tabs po bid x 2 days then 2 tabs po daily x 2 days then 1 tab po daily x 1 day prn asthma flare       Propylene Glycol (SYSTANE COMPLETE OP)        PULMICORT FLEXHALER 180 MCG/ACT inhaler        RESTASIS 0.05 % ophthalmic  emulsion  (Patient not taking: No sig reported)       simvastatin (ZOCOR) 10 MG tablet Take 10 mg by mouth At Bedtime       SYMBICORT 80-4.5 MCG/ACT Inhaler  (Patient not taking: No sig reported)       tolterodine (DETROL) 1 MG tablet Take 1 mg by mouth daily       tolterodine ER (DETROL LA) 4 MG 24 hr capsule Take 1 capsule (4 mg) by mouth daily 30 capsule 3     triamterene-HCTZ (DYAZIDE) 37.5-25 MG capsule TAKE 1 CAPSULE BY MOUTH EVERY MORNING ORALLY ONCE A DAY       UNABLE TO FIND MEDICATION NAME: presser vision vitamin - for eyes       valACYclovir (VALTREX) 1000 mg tablet          Social History     Tobacco Use     Smoking status: Never     Smokeless tobacco: Never   Substance Use Topics     Alcohol use: Not Currently       Sera Diaz comes into clinic today at the request of Abdriahman Smith for PTNS treatment.    This service provided today was under the direct supervision of Dr. Faye, who was available if needed.    Percutaneous Tibial Nerve Stimulation (PTNS)    Treatment number : 4    Percutaneous tibial nerve stimulation (PTNS) was prescribed for Sera Diaz's Overactive Bladder symptoms of urge incontinence. The needle electrode was inserted into the lower, inner aspect of the left leg. The surface electrode was placed on the inside arch of the foot on the treatment leg. The lead set was connected to the stimulator, and the needle electrode clip was connected to the needle electrode. The stimulator that produces an adjustable electrical pulse that travels to the sacral nerve plexus via the tibial nerve was adjusted to a setting of 10 with good toe curl noted. The voiding diary or patient's symptoms were reviewed prior to the start of treatment. The patient experienced no changes since the last treatment.    Armando Barney EMT  11/8/2022  2:36 PM

## 2022-11-15 ENCOUNTER — OFFICE VISIT (OUTPATIENT)
Dept: UROLOGY | Facility: CLINIC | Age: 87
End: 2022-11-15
Payer: MEDICARE

## 2022-11-15 DIAGNOSIS — N39.41 URGE INCONTINENCE: Primary | ICD-10-CM

## 2022-11-15 PROCEDURE — 64566 NEUROELTRD STIM POST TIBIAL: CPT

## 2022-11-15 NOTE — PROGRESS NOTES
Chief Complaint   Patient presents with     Allied Health Visit     PTNS #5       Patient Active Problem List   Diagnosis     Trochanteric bursitis       Allergies   Allergen Reactions     Sulfa Drugs Other (See Comments) and Rash     Face swells       Iodine Rash     Oxybutynin Other (See Comments)     Severe dry mouth     Mirabegron      Other reaction(s): Dizziness       Current Outpatient Medications   Medication Sig Dispense Refill     alendronate (FOSAMAX) 70 MG tablet Take 70 mg by mouth       alendronate (FOSAMAX) 70 MG tablet        amLODIPine (NORVASC) 2.5 MG tablet        amLODIPine (NORVASC) 2.5 MG tablet Take 2.5 mg by mouth daily       BETAMETHASONE PO        cholecalciferol 25 MCG (1000 UT) TABS Take 1,000 Units by mouth       clonazePAM (KLONOPIN) 0.5 MG tablet        clonazePAM (KLONOPIN) 0.5 MG tablet        [facility] MUCUS EXTENDED RELEASE 600 MG 12 hr tablet TAKE 1 TABLET (600 MG) BY MOUTH TWICE DAILY AS NEEDED (PHLEGM).       cycloSPORINE (RESTASIS) 0.05 % ophthalmic emulsion 1 drop (Patient not taking: No sig reported)       diazepam (VALIUM) 2 MG tablet Take by mouth every 6 hours as needed for anxiety       docosanol (ABREVA) 10 % CREA cream Apply topically 5 times daily       fluticasone (FLONASE) 50 MCG/ACT nasal spray Spray 2 sprays in nostril       levalbuterol (XOPENEX HFA) 45 MCG/ACT inhaler INHALE 2 PUFFS EVERY FOUR HOURS AS NEEDED FOR SHORTNESS OF BREATH. (Patient not taking: No sig reported)       montelukast (SINGULAIR) 10 MG tablet Take 10 mg by mouth At Bedtime       nystatin (MYCOSTATIN) [id number] UNIT/GM external cream APPLY TO AFFECTED AREA TWICE DAILY UNTIL HEALED.       omeprazole (PRILOSEC) 20 MG DR capsule TAKE 1 CAPSULE BY MOUTH EVERY DAY       predniSONE (DELTASONE) 5 MG tablet 2 tabs po tid x 2 days then 2 tabs po bid x 2 days then 2 tabs po daily x 2 days then 1 tab po daily x 1 day prn asthma flare       Propylene Glycol (SYSTANE COMPLETE OP)        PULMICORT FLEXHALER 180  MCG/ACT inhaler        RESTASIS 0.05 % ophthalmic emulsion  (Patient not taking: No sig reported)       simvastatin (ZOCOR) 10 MG tablet Take 10 mg by mouth At Bedtime       SYMBICORT 80-4.5 MCG/ACT Inhaler  (Patient not taking: No sig reported)       tolterodine (DETROL) 1 MG tablet Take 1 mg by mouth daily       tolterodine ER (DETROL LA) 4 MG 24 hr capsule Take 1 capsule (4 mg) by mouth daily 30 capsule 3     triamterene-HCTZ (DYAZIDE) 37.5-25 MG capsule TAKE 1 CAPSULE BY MOUTH EVERY MORNING ORALLY ONCE A DAY       UNABLE TO FIND MEDICATION NAME: presser vision vitamin - for eyes       valACYclovir (VALTREX) 1000 mg tablet          Social History     Tobacco Use     Smoking status: Never     Smokeless tobacco: Never   Substance Use Topics     Alcohol use: Not Currently       Sera Diaz comes into clinic today at the request of Abdirahman Smith for PTNS treatment.    This service provided today was under the direct supervision of Dr. Smith, who was available if needed.    Percutaneous Tibial Nerve Stimulation (PTNS)    Treatment number : 5    Percutaneous tibial nerve stimulation (PTNS) was prescribed for Sera Diaz's Overactive Bladder symptoms of urge incontinence. The needle electrode was inserted into the lower, inner aspect of the left leg. The surface electrode was placed on the inside arch of the foot on the treatment leg. The lead set was connected to the stimulator, and the needle electrode clip was connected to the needle electrode. The stimulator that produces an adjustable electrical pulse that travels to the sacral nerve plexus via the tibial nerve was adjusted to a setting of 7 with good toe curl noted. The voiding diary or patient's symptoms were reviewed prior to the start of treatment. The patient experienced no changes since the last treatment.    Armando Barney EMT  11/15/2022  2:05 PM

## 2022-11-21 ENCOUNTER — OFFICE VISIT (OUTPATIENT)
Dept: UROLOGY | Facility: CLINIC | Age: 87
End: 2022-11-21
Payer: MEDICARE

## 2022-11-21 DIAGNOSIS — N39.41 URGE INCONTINENCE: Primary | ICD-10-CM

## 2022-11-21 PROCEDURE — 99207 PR NO BILLABLE SERVICE THIS VISIT: CPT

## 2022-11-21 NOTE — PROGRESS NOTES
Patient presented to clinic for PTNS treatment #6 today. Unfortunately, the clinic is currently out of PTNS kits, so I helped reschedule the patient for her PTNS treatments. She will follow up next week.    Ej Barney EMT

## 2022-12-02 ENCOUNTER — OFFICE VISIT (OUTPATIENT)
Dept: UROLOGY | Facility: CLINIC | Age: 87
End: 2022-12-02
Payer: MEDICARE

## 2022-12-02 DIAGNOSIS — N39.41 URGE INCONTINENCE: Primary | ICD-10-CM

## 2022-12-02 PROCEDURE — 64566 NEUROELTRD STIM POST TIBIAL: CPT

## 2022-12-02 NOTE — PROGRESS NOTES
Chief Complaint   Patient presents with     Allied Health Visit     PTNS       Patient Active Problem List   Diagnosis     Trochanteric bursitis       Allergies   Allergen Reactions     Sulfa Drugs Other (See Comments) and Rash     Face swells       Iodine Rash     Oxybutynin Other (See Comments)     Severe dry mouth     Mirabegron      Other reaction(s): Dizziness       Current Outpatient Medications   Medication Sig Dispense Refill     alendronate (FOSAMAX) 70 MG tablet Take 70 mg by mouth       alendronate (FOSAMAX) 70 MG tablet        amLODIPine (NORVASC) 2.5 MG tablet        amLODIPine (NORVASC) 2.5 MG tablet Take 2.5 mg by mouth daily       BETAMETHASONE PO        cholecalciferol 25 MCG (1000 UT) TABS Take 1,000 Units by mouth       clonazePAM (KLONOPIN) 0.5 MG tablet        clonazePAM (KLONOPIN) 0.5 MG tablet        [facility] MUCUS EXTENDED RELEASE 600 MG 12 hr tablet TAKE 1 TABLET (600 MG) BY MOUTH TWICE DAILY AS NEEDED (PHLEGM).       cycloSPORINE (RESTASIS) 0.05 % ophthalmic emulsion 1 drop (Patient not taking: No sig reported)       diazepam (VALIUM) 2 MG tablet Take by mouth every 6 hours as needed for anxiety       docosanol (ABREVA) 10 % CREA cream Apply topically 5 times daily       fluticasone (FLONASE) 50 MCG/ACT nasal spray Spray 2 sprays in nostril       levalbuterol (XOPENEX HFA) 45 MCG/ACT inhaler INHALE 2 PUFFS EVERY FOUR HOURS AS NEEDED FOR SHORTNESS OF BREATH. (Patient not taking: No sig reported)       montelukast (SINGULAIR) 10 MG tablet Take 10 mg by mouth At Bedtime       nystatin (MYCOSTATIN) [id number] UNIT/GM external cream APPLY TO AFFECTED AREA TWICE DAILY UNTIL HEALED.       omeprazole (PRILOSEC) 20 MG DR capsule TAKE 1 CAPSULE BY MOUTH EVERY DAY       predniSONE (DELTASONE) 5 MG tablet 2 tabs po tid x 2 days then 2 tabs po bid x 2 days then 2 tabs po daily x 2 days then 1 tab po daily x 1 day prn asthma flare       Propylene Glycol (SYSTANE COMPLETE OP)        PULMICORT FLEXHALER 180 MCG/ACT  inhaler        RESTASIS 0.05 % ophthalmic emulsion  (Patient not taking: No sig reported)       simvastatin (ZOCOR) 10 MG tablet Take 10 mg by mouth At Bedtime       SYMBICORT 80-4.5 MCG/ACT Inhaler  (Patient not taking: No sig reported)       tolterodine (DETROL) 1 MG tablet Take 1 mg by mouth daily       tolterodine ER (DETROL LA) 4 MG 24 hr capsule Take 1 capsule (4 mg) by mouth daily 30 capsule 3     triamterene-HCTZ (DYAZIDE) 37.5-25 MG capsule TAKE 1 CAPSULE BY MOUTH EVERY MORNING ORALLY ONCE A DAY       UNABLE TO FIND MEDICATION NAME: presser vision vitamin - for eyes       valACYclovir (VALTREX) 1000 mg tablet          Social History     Tobacco Use     Smoking status: Never     Smokeless tobacco: Never   Substance Use Topics     Alcohol use: Not Currently       Sera Diaz comes into clinic today at the request of Abdirahman Smith for PTNS treatment.    This service provided today was under the direct supervision of Dr. Pritchard, who was available if needed.    Percutaneous Tibial Nerve Stimulation (PTNS)    Treatment number : 6    Percutaneous tibial nerve stimulation (PTNS) was prescribed for Sera Diaz's Overactive Bladder symptoms of urge incontinence. The needle electrode was inserted into the lower, inner aspect of the left leg. The surface electrode was placed on the inside arch of the foot on the treatment leg. The lead set was connected to the stimulator, and the needle electrode clip was connected to the needle electrode. The stimulator that produces an adjustable electrical pulse that travels to the sacral nerve plexus via the tibial nerve was adjusted to a setting of 6 with good toe curl noted. The voiding diary or patient's symptoms were reviewed prior to the start of treatment. The patient experienced no changes since the last treatment.      Jenifer Chapa  12/2/2022  12:24 PM

## 2022-12-06 ENCOUNTER — OFFICE VISIT (OUTPATIENT)
Dept: UROLOGY | Facility: CLINIC | Age: 87
End: 2022-12-06
Payer: MEDICARE

## 2022-12-06 DIAGNOSIS — N39.41 URGE INCONTINENCE: Primary | ICD-10-CM

## 2022-12-06 PROCEDURE — 64566 NEUROELTRD STIM POST TIBIAL: CPT

## 2022-12-06 NOTE — PROGRESS NOTES
Chief Complaint   Patient presents with     Allied Health Visit     PTNS #7       Patient Active Problem List   Diagnosis     Trochanteric bursitis       Allergies   Allergen Reactions     Sulfa Drugs Other (See Comments) and Rash     Face swells       Iodine Rash     Oxybutynin Other (See Comments)     Severe dry mouth     Mirabegron      Other reaction(s): Dizziness       Current Outpatient Medications   Medication Sig Dispense Refill     alendronate (FOSAMAX) 70 MG tablet Take 70 mg by mouth       alendronate (FOSAMAX) 70 MG tablet        amLODIPine (NORVASC) 2.5 MG tablet        amLODIPine (NORVASC) 2.5 MG tablet Take 2.5 mg by mouth daily       BETAMETHASONE PO        cholecalciferol 25 MCG (1000 UT) TABS Take 1,000 Units by mouth       clonazePAM (KLONOPIN) 0.5 MG tablet        clonazePAM (KLONOPIN) 0.5 MG tablet        [facility] MUCUS EXTENDED RELEASE 600 MG 12 hr tablet TAKE 1 TABLET (600 MG) BY MOUTH TWICE DAILY AS NEEDED (PHLEGM).       cycloSPORINE (RESTASIS) 0.05 % ophthalmic emulsion 1 drop (Patient not taking: No sig reported)       diazepam (VALIUM) 2 MG tablet Take by mouth every 6 hours as needed for anxiety       docosanol (ABREVA) 10 % CREA cream Apply topically 5 times daily       fluticasone (FLONASE) 50 MCG/ACT nasal spray Spray 2 sprays in nostril       levalbuterol (XOPENEX HFA) 45 MCG/ACT inhaler INHALE 2 PUFFS EVERY FOUR HOURS AS NEEDED FOR SHORTNESS OF BREATH. (Patient not taking: No sig reported)       montelukast (SINGULAIR) 10 MG tablet Take 10 mg by mouth At Bedtime       nystatin (MYCOSTATIN) 263642 UNIT/GM external cream APPLY TO AFFECTED AREA TWICE DAILY UNTIL HEALED.       omeprazole (PRILOSEC) 20 MG DR capsule TAKE 1 CAPSULE BY MOUTH EVERY DAY       predniSONE (DELTASONE) 5 MG tablet 2 tabs po tid x 2 days then 2 tabs po bid x 2 days then 2 tabs po daily x 2 days then 1 tab po daily x 1 day prn asthma flare       Propylene Glycol (SYSTANE COMPLETE OP)        PULMICORT FLEXHALER 180  MCG/ACT inhaler        RESTASIS 0.05 % ophthalmic emulsion  (Patient not taking: No sig reported)       simvastatin (ZOCOR) 10 MG tablet Take 10 mg by mouth At Bedtime       SYMBICORT 80-4.5 MCG/ACT Inhaler  (Patient not taking: No sig reported)       tolterodine (DETROL) 1 MG tablet Take 1 mg by mouth daily       tolterodine ER (DETROL LA) 4 MG 24 hr capsule Take 1 capsule (4 mg) by mouth daily 30 capsule 3     triamterene-HCTZ (DYAZIDE) 37.5-25 MG capsule TAKE 1 CAPSULE BY MOUTH EVERY MORNING ORALLY ONCE A DAY       UNABLE TO FIND MEDICATION NAME: presser vision vitamin - for eyes       valACYclovir (VALTREX) 1000 mg tablet          Social History     Tobacco Use     Smoking status: Never     Smokeless tobacco: Never   Substance Use Topics     Alcohol use: Not Currently       Sera Diaz comes into clinic today at the request of Abdirahman Smith for PTNS treatment.    This service provided today was under the direct supervision of Ana Rosa Perez CNP, who was available if needed.    Percutaneous Tibial Nerve Stimulation (PTNS)    Treatment number : 6    Percutaneous tibial nerve stimulation (PTNS) was prescribed for Sera Diaz's Overactive Bladder symptoms of urge incontinence. The needle electrode was inserted into the lower, inner aspect of the left leg. The surface electrode was placed on the inside arch of the foot on the treatment leg. The lead set was connected to the stimulator, and the needle electrode clip was connected to the needle electrode. The stimulator that produces an adjustable electrical pulse that travels to the sacral nerve plexus via the tibial nerve was adjusted to a setting of 10 with good toe curl noted. The voiding diary or patient's symptoms were reviewed prior to the start of treatment. The patient experienced no changes since the last treatment.    Armando Barney EMT  12/6/2022  2:29 PM

## 2022-12-10 ENCOUNTER — HEALTH MAINTENANCE LETTER (OUTPATIENT)
Age: 87
End: 2022-12-10

## 2022-12-13 ENCOUNTER — OFFICE VISIT (OUTPATIENT)
Dept: UROLOGY | Facility: CLINIC | Age: 87
End: 2022-12-13
Payer: MEDICARE

## 2022-12-13 DIAGNOSIS — N39.41 URGE INCONTINENCE: Primary | ICD-10-CM

## 2022-12-13 PROCEDURE — 64566 NEUROELTRD STIM POST TIBIAL: CPT

## 2022-12-13 NOTE — PROGRESS NOTES
Chief Complaint   Patient presents with     Allied Health Visit     PTNS       Patient Active Problem List   Diagnosis     Trochanteric bursitis       Allergies   Allergen Reactions     Sulfa Drugs Other (See Comments) and Rash     Face swells       Iodine Rash     Oxybutynin Other (See Comments)     Severe dry mouth     Mirabegron      Other reaction(s): Dizziness       Current Outpatient Medications   Medication Sig Dispense Refill     alendronate (FOSAMAX) 70 MG tablet Take 70 mg by mouth       alendronate (FOSAMAX) 70 MG tablet        amLODIPine (NORVASC) 2.5 MG tablet        amLODIPine (NORVASC) 2.5 MG tablet Take 2.5 mg by mouth daily       BETAMETHASONE PO        cholecalciferol 25 MCG (1000 UT) TABS Take 1,000 Units by mouth       clonazePAM (KLONOPIN) 0.5 MG tablet        clonazePAM (KLONOPIN) 0.5 MG tablet        [facility] MUCUS EXTENDED RELEASE 600 MG 12 hr tablet TAKE 1 TABLET (600 MG) BY MOUTH TWICE DAILY AS NEEDED (PHLEGM).       cycloSPORINE (RESTASIS) 0.05 % ophthalmic emulsion 1 drop (Patient not taking: No sig reported)       diazepam (VALIUM) 2 MG tablet Take by mouth every 6 hours as needed for anxiety       docosanol (ABREVA) 10 % CREA cream Apply topically 5 times daily       fluticasone (FLONASE) 50 MCG/ACT nasal spray Spray 2 sprays in nostril       levalbuterol (XOPENEX HFA) 45 MCG/ACT inhaler INHALE 2 PUFFS EVERY FOUR HOURS AS NEEDED FOR SHORTNESS OF BREATH. (Patient not taking: No sig reported)       montelukast (SINGULAIR) 10 MG tablet Take 10 mg by mouth At Bedtime       nystatin (MYCOSTATIN) 710427 UNIT/GM external cream APPLY TO AFFECTED AREA TWICE DAILY UNTIL HEALED.       omeprazole (PRILOSEC) 20 MG DR capsule TAKE 1 CAPSULE BY MOUTH EVERY DAY       predniSONE (DELTASONE) 5 MG tablet 2 tabs po tid x 2 days then 2 tabs po bid x 2 days then 2 tabs po daily x 2 days then 1 tab po daily x 1 day prn asthma flare       Propylene Glycol (SYSTANE COMPLETE OP)        PULMICORT FLEXHALER 180 MCG/ACT  inhaler        RESTASIS 0.05 % ophthalmic emulsion  (Patient not taking: No sig reported)       simvastatin (ZOCOR) 10 MG tablet Take 10 mg by mouth At Bedtime       SYMBICORT 80-4.5 MCG/ACT Inhaler  (Patient not taking: No sig reported)       tolterodine (DETROL) 1 MG tablet Take 1 mg by mouth daily       tolterodine ER (DETROL LA) 4 MG 24 hr capsule Take 1 capsule (4 mg) by mouth daily 30 capsule 3     triamterene-HCTZ (DYAZIDE) 37.5-25 MG capsule TAKE 1 CAPSULE BY MOUTH EVERY MORNING ORALLY ONCE A DAY       UNABLE TO FIND MEDICATION NAME: presser vision vitamin - for eyes       valACYclovir (VALTREX) 1000 mg tablet          Social History     Tobacco Use     Smoking status: Never     Smokeless tobacco: Never   Substance Use Topics     Alcohol use: Not Currently       Sera Diaz comes into clinic today at the request of Abdirahman Smith for PTNS treatment.    This service provided today was under the direct supervision of Dr. Faye, who was available if needed.    Percutaneous Tibial Nerve Stimulation (PTNS)    Treatment number : 7    Percutaneous tibial nerve stimulation (PTNS) was prescribed for Sera Diaz's Overactive Bladder symptoms of urge incontinence. The needle electrode was inserted into the lower, inner aspect of the left leg. The surface electrode was placed on the inside arch of the foot on the treatment leg. The lead set was connected to the stimulator, and the needle electrode clip was connected to the needle electrode. The stimulator that produces an adjustable electrical pulse that travels to the sacral nerve plexus via the tibial nerve was adjusted to a setting of 15 with good toe curl noted. The voiding diary or patient's symptoms were reviewed prior to the start of treatment. The patient experienced decrease in nighttime frequency since the last treatment.    Armando Barney EMT  12/13/2022  2:26 PM

## 2022-12-20 ENCOUNTER — OFFICE VISIT (OUTPATIENT)
Dept: UROLOGY | Facility: CLINIC | Age: 87
End: 2022-12-20
Payer: MEDICARE

## 2022-12-20 DIAGNOSIS — N39.41 URGE INCONTINENCE: Primary | ICD-10-CM

## 2022-12-20 PROCEDURE — 64566 NEUROELTRD STIM POST TIBIAL: CPT

## 2022-12-20 NOTE — PROGRESS NOTES
Chief Complaint   Patient presents with     Allied Health Visit     PTNS #9       Patient Active Problem List   Diagnosis     Trochanteric bursitis       Allergies   Allergen Reactions     Sulfa Drugs Other (See Comments) and Rash     Face swells       Iodine Rash     Oxybutynin Other (See Comments)     Severe dry mouth     Mirabegron      Other reaction(s): Dizziness       Current Outpatient Medications   Medication Sig Dispense Refill     alendronate (FOSAMAX) 70 MG tablet Take 70 mg by mouth       alendronate (FOSAMAX) 70 MG tablet        amLODIPine (NORVASC) 2.5 MG tablet        amLODIPine (NORVASC) 2.5 MG tablet Take 2.5 mg by mouth daily       BETAMETHASONE PO        cholecalciferol 25 MCG (1000 UT) TABS Take 1,000 Units by mouth       clonazePAM (KLONOPIN) 0.5 MG tablet        clonazePAM (KLONOPIN) 0.5 MG tablet        [facility] MUCUS EXTENDED RELEASE 600 MG 12 hr tablet TAKE 1 TABLET (600 MG) BY MOUTH TWICE DAILY AS NEEDED (PHLEGM).       cycloSPORINE (RESTASIS) 0.05 % ophthalmic emulsion 1 drop (Patient not taking: No sig reported)       diazepam (VALIUM) 2 MG tablet Take by mouth every 6 hours as needed for anxiety       docosanol (ABREVA) 10 % CREA cream Apply topically 5 times daily       fluticasone (FLONASE) 50 MCG/ACT nasal spray Spray 2 sprays in nostril       levalbuterol (XOPENEX HFA) 45 MCG/ACT inhaler INHALE 2 PUFFS EVERY FOUR HOURS AS NEEDED FOR SHORTNESS OF BREATH. (Patient not taking: No sig reported)       montelukast (SINGULAIR) 10 MG tablet Take 10 mg by mouth At Bedtime       nystatin (MYCOSTATIN) 537974 UNIT/GM external cream APPLY TO AFFECTED AREA TWICE DAILY UNTIL HEALED.       omeprazole (PRILOSEC) 20 MG DR capsule TAKE 1 CAPSULE BY MOUTH EVERY DAY       predniSONE (DELTASONE) 5 MG tablet 2 tabs po tid x 2 days then 2 tabs po bid x 2 days then 2 tabs po daily x 2 days then 1 tab po daily x 1 day prn asthma flare       Propylene Glycol (SYSTANE COMPLETE OP)        PULMICORT FLEXHALER 180  MCG/ACT inhaler        RESTASIS 0.05 % ophthalmic emulsion  (Patient not taking: No sig reported)       simvastatin (ZOCOR) 10 MG tablet Take 10 mg by mouth At Bedtime       SYMBICORT 80-4.5 MCG/ACT Inhaler  (Patient not taking: No sig reported)       tolterodine (DETROL) 1 MG tablet Take 1 mg by mouth daily       tolterodine ER (DETROL LA) 4 MG 24 hr capsule Take 1 capsule (4 mg) by mouth daily 30 capsule 3     triamterene-HCTZ (DYAZIDE) 37.5-25 MG capsule TAKE 1 CAPSULE BY MOUTH EVERY MORNING ORALLY ONCE A DAY       UNABLE TO FIND MEDICATION NAME: presser vision vitamin - for eyes       valACYclovir (VALTREX) 1000 mg tablet          Social History     Tobacco Use     Smoking status: Never     Smokeless tobacco: Never   Substance Use Topics     Alcohol use: Not Currently       Sera Diaz comes into clinic today at the request of Abdirahman Smith for PTNS treatment.    This service provided today was under the direct supervision of Dr. Arzate, who was available if needed.    Percutaneous Tibial Nerve Stimulation (PTNS)    Treatment number : 8    Percutaneous tibial nerve stimulation (PTNS) was prescribed for Sera Diaz's Overactive Bladder symptoms of urge incontinence. The needle electrode was inserted into the lower, inner aspect of the left leg. The surface electrode was placed on the inside arch of the foot on the treatment leg. The lead set was connected to the stimulator, and the needle electrode clip was connected to the needle electrode. The stimulator that produces an adjustable electrical pulse that travels to the sacral nerve plexus via the tibial nerve was adjusted to a setting of 3 with good toe curl noted. The voiding diary or patient's symptoms were reviewed prior to the start of treatment. The patient experienced decrease in incontinence episodes and decrease in nighttime frequency since the last treatment.    Armando Barney EMT  12/20/2022  2:11 PM

## 2022-12-27 ENCOUNTER — OFFICE VISIT (OUTPATIENT)
Dept: UROLOGY | Facility: CLINIC | Age: 87
End: 2022-12-27
Payer: MEDICARE

## 2022-12-27 DIAGNOSIS — N39.41 URGE INCONTINENCE: Primary | ICD-10-CM

## 2022-12-27 PROCEDURE — 64566 NEUROELTRD STIM POST TIBIAL: CPT

## 2022-12-27 NOTE — PROGRESS NOTES
Chief Complaint   Patient presents with     Allied Health Visit     PTNS       Patient Active Problem List   Diagnosis     Trochanteric bursitis       Allergies   Allergen Reactions     Sulfa Drugs Other (See Comments) and Rash     Face swells       Iodine Rash     Oxybutynin Other (See Comments)     Severe dry mouth     Mirabegron      Other reaction(s): Dizziness       Current Outpatient Medications   Medication Sig Dispense Refill     alendronate (FOSAMAX) 70 MG tablet Take 70 mg by mouth       alendronate (FOSAMAX) 70 MG tablet        amLODIPine (NORVASC) 2.5 MG tablet        amLODIPine (NORVASC) 2.5 MG tablet Take 2.5 mg by mouth daily       BETAMETHASONE PO        cholecalciferol 25 MCG (1000 UT) TABS Take 1,000 Units by mouth       clonazePAM (KLONOPIN) 0.5 MG tablet        clonazePAM (KLONOPIN) 0.5 MG tablet        [facility] MUCUS EXTENDED RELEASE 600 MG 12 hr tablet TAKE 1 TABLET (600 MG) BY MOUTH TWICE DAILY AS NEEDED (PHLEGM).       cycloSPORINE (RESTASIS) 0.05 % ophthalmic emulsion 1 drop (Patient not taking: No sig reported)       diazepam (VALIUM) 2 MG tablet Take by mouth every 6 hours as needed for anxiety       docosanol (ABREVA) 10 % CREA cream Apply topically 5 times daily       fluticasone (FLONASE) 50 MCG/ACT nasal spray Spray 2 sprays in nostril       levalbuterol (XOPENEX HFA) 45 MCG/ACT inhaler INHALE 2 PUFFS EVERY FOUR HOURS AS NEEDED FOR SHORTNESS OF BREATH. (Patient not taking: No sig reported)       montelukast (SINGULAIR) 10 MG tablet Take 10 mg by mouth At Bedtime       nystatin (MYCOSTATIN) [id number] UNIT/GM external cream APPLY TO AFFECTED AREA TWICE DAILY UNTIL HEALED.       omeprazole (PRILOSEC) 20 MG DR capsule TAKE 1 CAPSULE BY MOUTH EVERY DAY       predniSONE (DELTASONE) 5 MG tablet 2 tabs po tid x 2 days then 2 tabs po bid x 2 days then 2 tabs po daily x 2 days then 1 tab po daily x 1 day prn asthma flare       Propylene Glycol (SYSTANE COMPLETE OP)        PULMICORT FLEXHALER 180 MCG/ACT  inhaler        RESTASIS 0.05 % ophthalmic emulsion  (Patient not taking: No sig reported)       simvastatin (ZOCOR) 10 MG tablet Take 10 mg by mouth At Bedtime       SYMBICORT 80-4.5 MCG/ACT Inhaler  (Patient not taking: No sig reported)       tolterodine (DETROL) 1 MG tablet Take 1 mg by mouth daily       tolterodine ER (DETROL LA) 4 MG 24 hr capsule Take 1 capsule (4 mg) by mouth daily 30 capsule 3     triamterene-HCTZ (DYAZIDE) 37.5-25 MG capsule TAKE 1 CAPSULE BY MOUTH EVERY MORNING ORALLY ONCE A DAY       UNABLE TO FIND MEDICATION NAME: presser vision vitamin - for eyes       valACYclovir (VALTREX) 1000 mg tablet          Social History     Tobacco Use     Smoking status: Never     Smokeless tobacco: Never   Substance Use Topics     Alcohol use: Not Currently       Sera Diaz comes into clinic today at the request of Abdirahman Smith for PTNS treatment.    This service provided today was under the direct supervision of Dr. Cabral, who was available if needed.    Percutaneous Tibial Nerve Stimulation (PTNS)    Treatment number : 9    Percutaneous tibial nerve stimulation (PTNS) was prescribed for Sera Diaz's Overactive Bladder symptoms of urge incontinence. The needle electrode was inserted into the lower, inner aspect of the left leg. The surface electrode was placed on the inside arch of the foot on the treatment leg. The lead set was connected to the stimulator, and the needle electrode clip was connected to the needle electrode. The stimulator that produces an adjustable electrical pulse that travels to the sacral nerve plexus via the tibial nerve was adjusted to a setting of 12 with good toe curl noted. The voiding diary or patient's symptoms were reviewed prior to the start of treatment. The patient experienced decrease in incontinence episodes since the last treatment.    Armando Barney EMT  12/27/2022  2:24 PM

## 2023-01-10 ENCOUNTER — PRE VISIT (OUTPATIENT)
Dept: UROLOGY | Facility: CLINIC | Age: 88
End: 2023-01-10

## 2023-01-10 ENCOUNTER — OFFICE VISIT (OUTPATIENT)
Dept: UROLOGY | Facility: CLINIC | Age: 88
End: 2023-01-10
Payer: MEDICARE

## 2023-01-10 DIAGNOSIS — N39.41 URGE INCONTINENCE: Primary | ICD-10-CM

## 2023-01-10 PROCEDURE — 64566 NEUROELTRD STIM POST TIBIAL: CPT

## 2023-01-10 NOTE — TELEPHONE ENCOUNTER
Reason for visit: Follow up post PTNS     Relevant information: Overactive bladder    Records/imaging/labs/orders: All records available    Pt called: N/A    At Rooming: Standard

## 2023-01-10 NOTE — PROGRESS NOTES
Chief Complaint   Patient presents with     Allied Health Visit     PTNS       Patient Active Problem List   Diagnosis     Trochanteric bursitis       Allergies   Allergen Reactions     Sulfa Drugs Other (See Comments) and Rash     Face swells       Iodine Rash     Oxybutynin Other (See Comments)     Severe dry mouth     Mirabegron      Other reaction(s): Dizziness       Current Outpatient Medications   Medication Sig Dispense Refill     alendronate (FOSAMAX) 70 MG tablet Take 70 mg by mouth       alendronate (FOSAMAX) 70 MG tablet        amLODIPine (NORVASC) 2.5 MG tablet        amLODIPine (NORVASC) 2.5 MG tablet Take 2.5 mg by mouth daily       BETAMETHASONE PO        cholecalciferol 25 MCG (1000 UT) TABS Take 1,000 Units by mouth       clonazePAM (KLONOPIN) 0.5 MG tablet        clonazePAM (KLONOPIN) 0.5 MG tablet        [facility] MUCUS EXTENDED RELEASE 600 MG 12 hr tablet TAKE 1 TABLET (600 MG) BY MOUTH TWICE DAILY AS NEEDED (PHLEGM).       cycloSPORINE (RESTASIS) 0.05 % ophthalmic emulsion 1 drop (Patient not taking: No sig reported)       diazepam (VALIUM) 2 MG tablet Take by mouth every 6 hours as needed for anxiety       docosanol (ABREVA) 10 % CREA cream Apply topically 5 times daily       fluticasone (FLONASE) 50 MCG/ACT nasal spray Spray 2 sprays in nostril       levalbuterol (XOPENEX HFA) 45 MCG/ACT inhaler INHALE 2 PUFFS EVERY FOUR HOURS AS NEEDED FOR SHORTNESS OF BREATH. (Patient not taking: No sig reported)       montelukast (SINGULAIR) 10 MG tablet Take 10 mg by mouth At Bedtime       nystatin (MYCOSTATIN) [id number] UNIT/GM external cream APPLY TO AFFECTED AREA TWICE DAILY UNTIL HEALED.       omeprazole (PRILOSEC) 20 MG DR capsule TAKE 1 CAPSULE BY MOUTH EVERY DAY       predniSONE (DELTASONE) 5 MG tablet 2 tabs po tid x 2 days then 2 tabs po bid x 2 days then 2 tabs po daily x 2 days then 1 tab po daily x 1 day prn asthma flare       Propylene Glycol (SYSTANE COMPLETE OP)        PULMICORT FLEXHALER 180 MCG/ACT  inhaler        RESTASIS 0.05 % ophthalmic emulsion  (Patient not taking: No sig reported)       simvastatin (ZOCOR) 10 MG tablet Take 10 mg by mouth At Bedtime       SYMBICORT 80-4.5 MCG/ACT Inhaler  (Patient not taking: No sig reported)       tolterodine (DETROL) 1 MG tablet Take 1 mg by mouth daily       tolterodine ER (DETROL LA) 4 MG 24 hr capsule Take 1 capsule (4 mg) by mouth daily 30 capsule 3     triamterene-HCTZ (DYAZIDE) 37.5-25 MG capsule TAKE 1 CAPSULE BY MOUTH EVERY MORNING ORALLY ONCE A DAY       UNABLE TO FIND MEDICATION NAME: presser vision vitamin - for eyes       valACYclovir (VALTREX) 1000 mg tablet          Social History     Tobacco Use     Smoking status: Never     Smokeless tobacco: Never   Substance Use Topics     Alcohol use: Not Currently       Sera Diaz comes into clinic today at the request of Abdirahman Smith for PTNS treatment.    This service provided today was under the direct supervision of Lucia Jensen PA-C, who was available if needed.    Percutaneous Tibial Nerve Stimulation (PTNS)    Treatment number : 10    Percutaneous tibial nerve stimulation (PTNS) was prescribed for Sera Diaz's Overactive Bladder symptoms of urge incontinence. The needle electrode was inserted into the lower, inner aspect of the left leg. The surface electrode was placed on the inside arch of the foot on the treatment leg. The lead set was connected to the stimulator, and the needle electrode clip was connected to the needle electrode. The stimulator that produces an adjustable electrical pulse that travels to the sacral nerve plexus via the tibial nerve was adjusted to a setting of 19 with good toe curl noted. The voiding diary or patient's symptoms were reviewed prior to the start of treatment. The patient experienced increase in incontinence episodes since the last treatment.    Armando Barney EMT  1/10/2023  2:32 PM

## 2023-01-16 ENCOUNTER — OFFICE VISIT (OUTPATIENT)
Dept: UROLOGY | Facility: CLINIC | Age: 88
End: 2023-01-16
Payer: MEDICARE

## 2023-01-16 DIAGNOSIS — N39.41 URGE INCONTINENCE: Primary | ICD-10-CM

## 2023-01-16 PROCEDURE — 64566 NEUROELTRD STIM POST TIBIAL: CPT

## 2023-01-16 NOTE — PROGRESS NOTES
Chief Complaint   Patient presents with     Allied Health Visit     PTNS #11       Patient Active Problem List   Diagnosis     Trochanteric bursitis       Allergies   Allergen Reactions     Sulfa Drugs Other (See Comments) and Rash     Face swells       Iodine Rash     Oxybutynin Other (See Comments)     Severe dry mouth     Mirabegron      Other reaction(s): Dizziness       Current Outpatient Medications   Medication Sig Dispense Refill     alendronate (FOSAMAX) 70 MG tablet Take 70 mg by mouth       alendronate (FOSAMAX) 70 MG tablet        amLODIPine (NORVASC) 2.5 MG tablet        amLODIPine (NORVASC) 2.5 MG tablet Take 2.5 mg by mouth daily       BETAMETHASONE PO        cholecalciferol 25 MCG (1000 UT) TABS Take 1,000 Units by mouth       clonazePAM (KLONOPIN) 0.5 MG tablet        clonazePAM (KLONOPIN) 0.5 MG tablet        [facility] MUCUS EXTENDED RELEASE 600 MG 12 hr tablet TAKE 1 TABLET (600 MG) BY MOUTH TWICE DAILY AS NEEDED (PHLEGM).       cycloSPORINE (RESTASIS) 0.05 % ophthalmic emulsion 1 drop (Patient not taking: No sig reported)       diazepam (VALIUM) 2 MG tablet Take by mouth every 6 hours as needed for anxiety       docosanol (ABREVA) 10 % CREA cream Apply topically 5 times daily       fluticasone (FLONASE) 50 MCG/ACT nasal spray Spray 2 sprays in nostril       levalbuterol (XOPENEX HFA) 45 MCG/ACT inhaler INHALE 2 PUFFS EVERY FOUR HOURS AS NEEDED FOR SHORTNESS OF BREATH. (Patient not taking: No sig reported)       montelukast (SINGULAIR) 10 MG tablet Take 10 mg by mouth At Bedtime       nystatin (MYCOSTATIN) [id number] UNIT/GM external cream APPLY TO AFFECTED AREA TWICE DAILY UNTIL HEALED.       omeprazole (PRILOSEC) 20 MG DR capsule TAKE 1 CAPSULE BY MOUTH EVERY DAY       predniSONE (DELTASONE) 5 MG tablet 2 tabs po tid x 2 days then 2 tabs po bid x 2 days then 2 tabs po daily x 2 days then 1 tab po daily x 1 day prn asthma flare       Propylene Glycol (SYSTANE COMPLETE OP)        PULMICORT FLEXHALER 180  MCG/ACT inhaler        RESTASIS 0.05 % ophthalmic emulsion  (Patient not taking: No sig reported)       simvastatin (ZOCOR) 10 MG tablet Take 10 mg by mouth At Bedtime       SYMBICORT 80-4.5 MCG/ACT Inhaler  (Patient not taking: No sig reported)       tolterodine (DETROL) 1 MG tablet Take 1 mg by mouth daily       tolterodine ER (DETROL LA) 4 MG 24 hr capsule Take 1 capsule (4 mg) by mouth daily 30 capsule 3     triamterene-HCTZ (DYAZIDE) 37.5-25 MG capsule TAKE 1 CAPSULE BY MOUTH EVERY MORNING ORALLY ONCE A DAY       UNABLE TO FIND MEDICATION NAME: presser vision vitamin - for eyes       valACYclovir (VALTREX) 1000 mg tablet          Social History     Tobacco Use     Smoking status: Never     Smokeless tobacco: Never   Substance Use Topics     Alcohol use: Not Currently       Sera Diaz comes into clinic today at the request of Abdirahman Smith for PTNS treatment.    This service provided today was under the direct supervision of Dr. Christianson, who was available if needed.    Percutaneous Tibial Nerve Stimulation (PTNS)    Treatment number : 12    Percutaneous tibial nerve stimulation (PTNS) was prescribed for Sera Diaz's Overactive Bladder symptoms of urge incontinence. The needle electrode was inserted into the lower, inner aspect of the left leg. The surface electrode was placed on the inside arch of the foot on the treatment leg. The lead set was connected to the stimulator, and the needle electrode clip was connected to the needle electrode. The stimulator that produces an adjustable electrical pulse that travels to the sacral nerve plexus via the tibial nerve was adjusted to a setting of 19 with good toe curl noted. The voiding diary or patient's symptoms were reviewed prior to the start of treatment. The patient experienced no changes since the last treatment.    Armando Barney EMT  1/16/2023  12:54 PM

## 2023-01-31 ENCOUNTER — OFFICE VISIT (OUTPATIENT)
Dept: DERMATOLOGY | Facility: CLINIC | Age: 88
End: 2023-01-31
Payer: MEDICARE

## 2023-01-31 DIAGNOSIS — Z48.89 ENCOUNTER FOR POSTOPERATIVE WOUND CHECK: ICD-10-CM

## 2023-01-31 DIAGNOSIS — L90.5 SCAR: Primary | ICD-10-CM

## 2023-01-31 PROCEDURE — 99024 POSTOP FOLLOW-UP VISIT: CPT | Mod: GC | Performed by: DERMATOLOGY

## 2023-01-31 PROCEDURE — 11900 INJECT SKIN LESIONS </W 7: CPT | Mod: GC | Performed by: DERMATOLOGY

## 2023-01-31 RX ORDER — TRIAMCINOLONE ACETONIDE 40 MG/ML
12 INJECTION, SUSPENSION INTRA-ARTICULAR; INTRAMUSCULAR ONCE
Status: ACTIVE | OUTPATIENT
Start: 2023-01-31

## 2023-01-31 ASSESSMENT — PAIN SCALES - GENERAL: PAINLEVEL: NO PAIN (0)

## 2023-01-31 NOTE — LETTER
1/31/2023       RE: Sera Diaz  [address]  [place] [address]     Dear Colleague,    Thank you for referring your patient, Sera Diaz, to the [facility] DERMATOLOGIC SURGERY CLINIC [place] at [facility]. Please see a copy of my visit note below.    Dermatologic Surgery Clinic Note    Jan 31, 2023    Dermatology Problem List:  1. NMSC  - BCC, dorsum of nose, s/p Mohs 5/25/2022  - BCC, left temple, s/p Mohs 1/12/2022    Subjective: The patient is a 87 year old woman who presents today for a wound check after recent dermatologic surgery.     Patient reports persistence of bump at superior aspect of L nasal sidewall near incision. Lesion is less tender with ILK but still bothersome but may have increased in size.    No other associated symptoms, modifying factors, or prior treatments, except when noted above. The patient denies any constitutional symptoms, lymphadenopathy, unintentional weight loss or decreased appetite. No other skin concerns today.    Objective:   Gen: This is a well appearing individual in no acute distress. The patient is alert and oriented x 3.  An exam of the nose was performed today and visualized the following:  - Well healed transposition flap on nasal dorsum and L nasal sidewall  - Firm papule on left nasal sidewall near incision    Assessment and Plan:     # BCC, left dorsum of nose, s/p Mohs 5/25/2022 with suture reaction.  - Ddx for papule is hypertrophic scar vs calcinosis, will trial ILK today and can consider removal if not resolved at follow-up given symptomatic. Low c/f recurrence at this time  - ILK today, see procure below  - The patient should follow up with dermatologic surgery in 8 weeks, as well as continue with regular skin exams in general dermatology clinic.    ILK Procedure Note:  - Intra-lesional triamcinolone procedure note. After verbal consent and review of risk of pain and skin  thinning/atrophy, positioning and cleansing with isopropyl alcohol, 0.1 total mL of triamcinolone 40 mg/mL was injected into 1 lesion(s) on the see above. The patient tolerated the procedure well and left the dermatology clinic in good condition.    The patient was discussed with and evaluated by attending physician, Ilia Fulton MD.    Syed Newton MD  Dermatology, PGY-5  Mohs surgery fellow    Scribe Disclosure:  I, Trey Gray, am serving as a scribe to document services personally performed by Ilia Fulton MD based on data collection and the provider's statements to me.       Attending attestation:  I was present for key elements of the procedure and immediately available for all other portions of the procedure.  I have reviewed the note and edited it as necessary.    Ilia Fulton M.D.  Professor  Director of Dermatologic Surgery  Department of Dermatology  [facility]    Dermatology Surgery Clinic  [facility] [place] [address]      Drug Administration Record    Prior to injection, verified patient identity using patient's name and date of birth.  Due to injection administration, patient instructed to remain in clinic for 15 minutes  afterwards, and to report any adverse reaction to me immediately.    Drug Name: triamcinolone acetonide(kenalog)  Dose: 0.3mL of triamcinolone 40mg/mL, 12mg dose  Route administered: ID  NDC #: Kenalog-40 ([address]-3)  Amount of waste(mL):0.7  Reason for waste: Single use vial    LOT #: [id number]  SITE: see note  : amneal  EXPIRATION DATE: 10/24

## 2023-01-31 NOTE — NURSING NOTE
Dermatology Rooming Note    Sera Diaz's goals for this visit include:   Chief Complaint   Patient presents with     Derm Problem     Sera is here today for a recheck s/p MOHS on the left side of her nose and possible ILK injections      Ruben MUÑOZ CMA

## 2023-01-31 NOTE — PROGRESS NOTES
Drug Administration Record    Prior to injection, verified patient identity using patient's name and date of birth.  Due to injection administration, patient instructed to remain in clinic for 15 minutes  afterwards, and to report any adverse reaction to me immediately.    Drug Name: triamcinolone acetonide(kenalog)  Dose: 0.3mL of triamcinolone 40mg/mL, 12mg dose  Route administered: ID  NDC #: Kenalog-40 ([address]-6)  Amount of waste(mL):0.7  Reason for waste: Single use vial    LOT #: [id number]  SITE: see note  : amneal  EXPIRATION DATE: 10/24

## 2023-01-31 NOTE — PROGRESS NOTES
Dermatologic Surgery Clinic Note    Jan 31, 2023    Dermatology Problem List:  1. NMSC  - BCC, dorsum of nose, s/p Mohs 5/25/2022  - BCC, left temple, s/p Mohs 1/12/2022    Subjective: The patient is a 87 year old woman who presents today for a wound check after recent dermatologic surgery.     Patient reports persistence of bump at superior aspect of L nasal sidewall near incision. Lesion is less tender with ILK but still bothersome but may have increased in size.    No other associated symptoms, modifying factors, or prior treatments, except when noted above. The patient denies any constitutional symptoms, lymphadenopathy, unintentional weight loss or decreased appetite. No other skin concerns today.    Objective:   Gen: This is a well appearing individual in no acute distress. The patient is alert and oriented x 3.  An exam of the nose was performed today and visualized the following:  - Well healed transposition flap on nasal dorsum and L nasal sidewall  - Firm papule on left nasal sidewall near incision    Assessment and Plan:     # BCC, left dorsum of nose, s/p Mohs 5/25/2022 with suture reaction.  - Ddx for papule is hypertrophic scar vs calcinosis, will trial ILK today and can consider removal if not resolved at follow-up given symptomatic. Low c/f recurrence at this time  - ILK today, see procure below  - The patient should follow up with dermatologic surgery in 8 weeks, as well as continue with regular skin exams in general dermatology clinic.    ILK Procedure Note:  - Intra-lesional triamcinolone procedure note. After verbal consent and review of risk of pain and skin thinning/atrophy, positioning and cleansing with isopropyl alcohol, 0.1 total mL of triamcinolone 40 mg/mL was injected into 1 lesion(s) on the see above. The patient tolerated the procedure well and left the dermatology clinic in good condition.    The patient was discussed with and evaluated by attending physician, Ilia Fulton MD.    Syed  MD Aman  Dermatology, PGY-5  Mohs surgery fellow    Scribe Disclosure:  I, Trey Gray, am serving as a scribe to document services personally performed by Ilia Fulton MD based on data collection and the provider's statements to me.       Attending attestation:  I was present for key elements of the procedure and immediately available for all other portions of the procedure.  I have reviewed the note and edited it as necessary.    Ilia Fulton M.D.  Professor  Director of Dermatologic Surgery  Department of Dermatology  [facility]    Dermatology Surgery Clinic  [facility]  [address] [place], [place] [id number]

## 2023-02-17 ENCOUNTER — OFFICE VISIT (OUTPATIENT)
Dept: UROLOGY | Facility: CLINIC | Age: 88
End: 2023-02-17
Payer: MEDICARE

## 2023-02-17 VITALS — DIASTOLIC BLOOD PRESSURE: 73 MMHG | SYSTOLIC BLOOD PRESSURE: 136 MMHG | HEART RATE: 77 BPM

## 2023-02-17 DIAGNOSIS — J43.1 PANLOBULAR EMPHYSEMA (H): ICD-10-CM

## 2023-02-17 DIAGNOSIS — N39.41 URGE INCONTINENCE: Primary | ICD-10-CM

## 2023-02-17 DIAGNOSIS — C43.9 MALIGNANT MELANOMA OF SKIN (H): ICD-10-CM

## 2023-02-17 PROCEDURE — 99213 OFFICE O/P EST LOW 20 MIN: CPT | Performed by: OBSTETRICS & GYNECOLOGY

## 2023-02-17 ASSESSMENT — PAIN SCALES - GENERAL: PAINLEVEL: NO PAIN (0)

## 2023-02-17 NOTE — LETTER
2/17/2023       RE: Sera Diaz  [address]  [place] [address]     Dear Colleague,    Thank you for referring your patient, Sera Diaz, to the [facility] UROLOGY CLINIC [place] at [facility]. Please see a copy of my visit note below.    February 17, 2023    Return visit    Patient returns today for f/u after completing a course of PTNS. Sera did not see any improvement with PTNS in regards to her urinary urgency, urge incontinence and nocturia.    /73   Pulse 77   She is comfortable, in no distress, non-labored breathing.     A/P: 87 year old F with We discussed Sera's diagnosis and treatment options. She feels that she would not be able to self cath should she get urinary retention after BOTOX. I reassured her that it only occurs in 10-20% of injections and is transient lasting 7-10 days. I am concerned that she will not see improvement with the SNM if she did not see improvement with PTNS.     I gave Sera literature about both treatments. She will discuss with her family and let me know how she wants to proceed.    I spent a total of 20 minutes with  Ms. Diaz  on the date of the encounter in chart review, face to face patient visit, review of tests, documentation and/or discussion with other providers about the issues documented above.    Abdirahman Smith MD  Professor, OB/GYN  Urogynecologist    CC  Patient Care Team:  Rafaela Escobar MD as PCP - General (Family Medicine)  Gi Gotti MD as MD (Urology)  Teena Viveros NP as Referring Physician  Ilia Fulton MD as Assigned Surgical Provider  Abdirahman Smith MD as MD (OB/Gyn)  Yashira Payan MD as Referring Physician (Hospice And Palliative Care)  Abdirahman Smith MD as Assigned OBGYN Provider  SELF, REFERRED

## 2023-02-17 NOTE — NURSING NOTE
Chief Complaint   Patient presents with     Follow Up       Blood pressure 136/73, pulse 77. There is no height or weight on file to calculate BMI.    Patient Active Problem List   Diagnosis     Trochanteric bursitis       Allergies   Allergen Reactions     Sulfa Drugs Other (See Comments) and Rash     Face swells       Iodine Rash     Oxybutynin Other (See Comments)     Severe dry mouth     Mirabegron      Other reaction(s): Dizziness       Current Outpatient Medications   Medication Sig Dispense Refill     alendronate (FOSAMAX) 70 MG tablet Take 70 mg by mouth       alendronate (FOSAMAX) 70 MG tablet  (Patient not taking: Reported on 1/31/2023)       amLODIPine (NORVASC) 2.5 MG tablet  (Patient not taking: Reported on 1/31/2023)       amLODIPine (NORVASC) 2.5 MG tablet Take 2.5 mg by mouth daily       BETAMETHASONE PO        cholecalciferol 25 MCG (1000 UT) TABS Take 1,000 Units by mouth       clonazePAM (KLONOPIN) 0.5 MG tablet  (Patient not taking: Reported on 1/31/2023)       clonazePAM (KLONOPIN) 0.5 MG tablet        [employer] MUCUS EXTENDED RELEASE 600 MG 12 hr tablet TAKE 1 TABLET (600 MG) BY MOUTH TWICE DAILY AS NEEDED (PHLEGM).       cycloSPORINE (RESTASIS) 0.05 % ophthalmic emulsion 1 drop (Patient not taking: Reported on 5/25/2022)       diazepam (VALIUM) 2 MG tablet Take by mouth every 6 hours as needed for anxiety       docosanol (ABREVA) 10 % CREA cream Apply topically 5 times daily       fluticasone (FLONASE) 50 MCG/ACT nasal spray Spray 2 sprays in nostril       levalbuterol (XOPENEX HFA) 45 MCG/ACT inhaler INHALE 2 PUFFS EVERY FOUR HOURS AS NEEDED FOR SHORTNESS OF BREATH. (Patient not taking: Reported on 7/22/2022)       montelukast (SINGULAIR) 10 MG tablet Take 10 mg by mouth At Bedtime       nystatin (MYCOSTATIN) 851358 UNIT/GM external cream APPLY TO AFFECTED AREA TWICE DAILY UNTIL HEALED.       omeprazole (PRILOSEC) 20 MG DR capsule TAKE 1 CAPSULE BY MOUTH EVERY DAY       predniSONE (DELTASONE) 5 MG  tablet 2 tabs po tid x 2 days then 2 tabs po bid x 2 days then 2 tabs po daily x 2 days then 1 tab po daily x 1 day prn asthma flare       Propylene Glycol (SYSTANE COMPLETE OP)        PULMICORT FLEXHALER 180 MCG/ACT inhaler        RESTASIS 0.05 % ophthalmic emulsion  (Patient not taking: Reported on 5/25/2022)       simvastatin (ZOCOR) 10 MG tablet Take 10 mg by mouth At Bedtime       SYMBICORT 80-4.5 MCG/ACT Inhaler  (Patient not taking: Reported on 7/22/2022)       tolterodine (DETROL) 1 MG tablet Take 1 mg by mouth daily (Patient not taking: Reported on 1/31/2023)       tolterodine ER (DETROL LA) 4 MG 24 hr capsule Take 1 capsule (4 mg) by mouth daily (Patient not taking: Reported on 1/31/2023) 30 capsule 3     triamterene-HCTZ (DYAZIDE) 37.5-25 MG capsule TAKE 1 CAPSULE BY MOUTH EVERY MORNING ORALLY ONCE A DAY       UNABLE TO FIND MEDICATION NAME: presser vision vitamin - for eyes       valACYclovir (VALTREX) 1000 mg tablet          Social History     Tobacco Use     Smoking status: Never     Smokeless tobacco: Never   Substance Use Topics     Alcohol use: Not Currently       Adrian Soriano EMT-P  2/17/2023  1:41 PM

## 2023-02-17 NOTE — PROGRESS NOTES
February 17, 2023    Return visit    Patient returns today for f/u after completing a course of PTNS. Sera did not see any improvement with PTNS in regards to her urinary urgency, urge incontinence and nocturia.    /73   Pulse 77   She is comfortable, in no distress, non-labored breathing.     A/P: 87 year old F with We discussed Sera's diagnosis and treatment options. She feels that she would not be able to self cath should she get urinary retention after BOTOX. I reassured her that it only occurs in 10-20% of injections and is transient lasting 7-10 days. I am concerned that she will not see improvement with the SNM if she did not see improvement with PTNS.     I gave Sera literature about both treatments. She will discuss with her family and let me know how she wants to proceed.    I spent a total of 20 minutes with  Ms. Diaz  on the date of the encounter in chart review, face to face patient visit, review of tests, documentation and/or discussion with other providers about the issues documented above.    Abdirahman Smith MD  Professor, OB/GYN  Urogynecologist    CC  Patient Care Team:  Rafaela Escobar MD as PCP - General (Family Medicine)  Gi Gotti MD as MD (Urology)  Teena Viveros NP as Referring Physician  Ilia Fulton MD as Assigned Surgical Provider  Abdirahman Smith MD as MD (OB/Gyn)  Yashira Payan MD as Referring Physician (Hospice And Palliative Care)  Abdirahman Smith MD as Assigned OBGYN Provider  SELF, REFERRED

## 2023-03-29 ENCOUNTER — OFFICE VISIT (OUTPATIENT)
Dept: DERMATOLOGY | Facility: CLINIC | Age: 88
End: 2023-03-29
Payer: MEDICARE

## 2023-03-29 DIAGNOSIS — Z48.89 ENCOUNTER FOR POSTOPERATIVE WOUND CHECK: ICD-10-CM

## 2023-03-29 DIAGNOSIS — D49.2 NEOPLASM OF UNSPECIFIED BEHAVIOR OF BONE, SOFT TISSUE, AND SKIN: ICD-10-CM

## 2023-03-29 DIAGNOSIS — L90.5 SCAR: Primary | ICD-10-CM

## 2023-03-29 PROCEDURE — 88304 TISSUE EXAM BY PATHOLOGIST: CPT | Mod: TC | Performed by: DERMATOLOGY

## 2023-03-29 PROCEDURE — 11104 PUNCH BX SKIN SINGLE LESION: CPT | Mod: GC | Performed by: DERMATOLOGY

## 2023-03-29 PROCEDURE — 88304 TISSUE EXAM BY PATHOLOGIST: CPT | Mod: 26 | Performed by: DERMATOLOGY

## 2023-03-29 ASSESSMENT — PAIN SCALES - GENERAL: PAINLEVEL: NO PAIN (0)

## 2023-03-29 NOTE — LETTER
3/29/2023       RE: Sera Diaz  [address]  [place] [address]     Dear Colleague,    Thank you for referring your patient, Sera Diaz, to the [facility] DERMATOLOGIC SURGERY CLINIC [place] at [facility]. Please see a copy of my visit note below.    Dermatologic Surgery Clinic Note    Mar 29, 2023    Dermatology Problem List:  0. NUB, nasal dorsum s/p punch biopsy 3/29/2023  1. History of NMSC  - BCC, L dorsum of nose s/p MMS 5/25/2022  - BCC, L temple s/p MMS 1/12/2022    Subjective: The patient is a 87 year old woman who presents today for a wound check after recent dermatologic surgery. No concerns for infection today.    Patient reports persistence of firm nodule on L nasal sidewall, immediately adjacent to flap from prior Mohs surgery. No improvement with ILK.    No other associated symptoms, modifying factors, or prior treatments, except when noted above. The patient denies any constitutional symptoms, lymphadenopathy, unintentional weight loss or decreased appetite. No other skin concerns today.    Objective:   Gen: This is a well appearing individual in no acute distress. The patient is alert and oriented x 3.  An exam of the nasal dorsum was performed today and visualized the following:  - Mobile 5 mm semi-firm papule on the nasal dorsum.  - The surgical site noted above is clean, dry, and intact. There is no surrounding erythema, purulence, or significant tenderness to palpation. No clinical evidence of infection noted today.    Assessment and Plan:     # NUB, nasal dorsum ddx suture granuloma vs cyst vs calcinosis  - Punch biopsy today; see procedure note below.    # Scar 2/2 BCC, L dorsum of nose s/p MMS 5/25/2022  - The patient's surgery site(s) is/are healing very well. No evidence of infection on examination today.  - The patient was told to continue with wound cares until the area(s) is/are no longer crusted.   - The  patient should follow up with dermatologic surgery PRN, as well as continue with regular skin exams in general dermatology clinic.    Procedure Note:  - Punch biopsy procedure note, location(s): nasal dorsum. After discussion of benefits and risks including but not limited to bleeding, infection, scar, incomplete removal, recurrence, and non-diagnostic biopsy, written consent and photographs were obtained. The area was cleaned with isopropyl alcohol. 0.5mL of 1% lidocaine with epinephrine was injected to obtain adequate anesthesia and a 4 mm punch biopsy was performed at site(s). 5-0 fast absorbing gut sutures were utilized to approximate the epidermal edges. White petrolatum ointment and a bandage was applied to the wound. Explicit verbal and written wound care instructions were provided. The patient left the dermatology clinic in good condition.     The patient was discussed with and evaluated by attending physician, Ilia Fulton MD.    Syed Newton MD  Dermatology, PGY-5  Mohs surgery fellow    Scribe Disclosure:  IJohn, am serving as a scribe to document services personally performed by Ilia Fulton MD based on data collection and the provider's statements to me.     Attending attestation:  I personally performed the entire procedure.  I have reviewed the note and edited it as necessary, and agree with its contents.    Ilia Fulton M.D.  Professor  Director of Dermatologic Surgery  Department of Dermatology  [facility]    Dermatology Surgery Clinic  [facility]  [address] [place] [id number]

## 2023-03-29 NOTE — PATIENT INSTRUCTIONS

## 2023-03-29 NOTE — PROGRESS NOTES
Dermatologic Surgery Clinic Note    Mar 29, 2023    Dermatology Problem List:  0. NUB, nasal dorsum s/p punch biopsy 3/29/2023  1. History of NMSC  - BCC, L dorsum of nose s/p [facility] 5/25/2022  - BCC, L temple s/p [facility] 1/12/2022    Subjective: The patient is a 87 year old woman who presents today for a wound check after recent dermatologic surgery. No concerns for infection today.    Patient reports persistence of firm nodule on L nasal sidewall, immediately adjacent to flap from prior Mohs surgery. No improvement with ILK.    No other associated symptoms, modifying factors, or prior treatments, except when noted above. The patient denies any constitutional symptoms, lymphadenopathy, unintentional weight loss or decreased appetite. No other skin concerns today.    Objective:   Gen: This is a well appearing individual in no acute distress. The patient is alert and oriented x 3.  An exam of the nasal dorsum was performed today and visualized the following:  - Mobile 5 mm semi-firm papule on the nasal dorsum.  - The surgical site noted above is clean, dry, and intact. There is no surrounding erythema, purulence, or significant tenderness to palpation. No clinical evidence of infection noted today.    Assessment and Plan:     # NUB, nasal dorsum ddx suture granuloma vs cyst vs calcinosis  - Punch biopsy today; see procedure note below.    # Scar 2/2 BCC, L dorsum of nose s/p [facility] 5/25/2022  - The patient's surgery site(s) is/are healing very well. No evidence of infection on examination today.  - The patient was told to continue with wound cares until the area(s) is/are no longer crusted.   - The patient should follow up with dermatologic surgery PRN, as well as continue with regular skin exams in general dermatology clinic.    Procedure Note:  - Punch biopsy procedure note, location(s): nasal dorsum. After discussion of benefits and risks including but not limited to bleeding, infection, scar, incomplete removal,  recurrence, and non-diagnostic biopsy, written consent and photographs were obtained. The area was cleaned with isopropyl alcohol. 0.5mL of 1% lidocaine with epinephrine was injected to obtain adequate anesthesia and a 4 mm punch biopsy was performed at site(s). 5-0 fast absorbing gut sutures were utilized to approximate the epidermal edges. White petrolatum ointment and a bandage was applied to the wound. Explicit verbal and written wound care instructions were provided. The patient left the dermatology clinic in good condition.     The patient was discussed with and evaluated by attending physician, Ilia Fulton MD.    Syed Newton MD  Dermatology, PGY-5  Mohs surgery fellow    Scribe Disclosure:  I, John Rojas, am serving as a scribe to document services personally performed by Ilia Fulton MD based on data collection and the provider's statements to me.     Attending attestation:  I personally performed the entire procedure.  I have reviewed the note and edited it as necessary, and agree with its contents.    Ilia Fulton M.D.  Professor  Director of Dermatologic Surgery  Department of Dermatology  [facility]    Dermatology Surgery Clinic  [facility]  [address] [place] [id number]

## 2023-03-29 NOTE — NURSING NOTE
Chief Complaint   Patient presents with     Derm Problem     Patient is here today regarding scar evaluation.     Judy DUMONT RN

## 2023-03-31 LAB
PATH REPORT.COMMENTS IMP SPEC: NORMAL
PATH REPORT.COMMENTS IMP SPEC: NORMAL
PATH REPORT.FINAL DX SPEC: NORMAL
PATH REPORT.GROSS SPEC: NORMAL
PATH REPORT.MICROSCOPIC SPEC OTHER STN: NORMAL
PATH REPORT.RELEVANT HX SPEC: NORMAL

## 2023-07-25 ENCOUNTER — TRANSCRIBE ORDERS (OUTPATIENT)
Dept: OTHER | Age: 88
End: 2023-07-25

## 2023-07-25 ENCOUNTER — PATIENT OUTREACH (OUTPATIENT)
Dept: ONCOLOGY | Facility: CLINIC | Age: 88
End: 2023-07-25
Payer: MEDICARE

## 2023-07-25 ENCOUNTER — PRE VISIT (OUTPATIENT)
Dept: ONCOLOGY | Facility: CLINIC | Age: 88
End: 2023-07-25
Payer: MEDICARE

## 2023-07-25 DIAGNOSIS — N85.8 UTERINE MASS: Primary | ICD-10-CM

## 2023-07-25 NOTE — PROGRESS NOTES
New Patient Hematology / Oncology Nurse Navigator Note     Referral Date: 7/25/23    Referring provider: Dr Yashira Payan     Referring Clinic/Organization: [facility]      Referred to: [facility]    Requested provider (if applicable): First available - did not specify     Evaluation for : uterine mass      Clinical History (per Nurse review of records provided):    No records/reports sent with referral. Unable to pull records through CE without patient consent. Will flag for records needed and follow-up once records are viewable in CE.    Records Location: Care Everywhere     Records Needed:   Records from [facility] per protocol, need pt consent to pull in via CE    Keren Sierra, TAHIRAN, RN, PHN, OCN  Hematology/Oncology Nurse Navigator  [facility]  [phone]

## 2023-07-25 NOTE — TELEPHONE ENCOUNTER
RECORDS STATUS - ALL OTHER DIAGNOSIS      RECORDS RECEIVED FROM: [facility]   Appt Date: NN WQ Update: Patient has now been scheduled 8/9/2023 with Dr. Mendez    Uterine mass   Action    Action Taken 7/25/2023 4:15pm ANIA     I called pt Sera - she gave me permission to update her [facility] records in CE.     Sera will be out of town next week but can cancel plans if need be. I sent an ib-message the NN team.     I faxed over a request for imaging to [facility]    7/26/2023 5:58pm AINA   I resolved the MRI scan from [facility] in PACS      NOTES STATUS DETAILS   OFFICE NOTE from referring provider Complete Yashira Payan MD    OFFICE NOTE from medical oncologist     DISCHARGE SUMMARY from hospital     DISCHARGE REPORT from the ER     OPERATIVE REPORT     MEDICATION LIST Complete [facility]   CLINICAL TRIAL TREATMENTS TO DATE     LABS     PATHOLOGY REPORTS     ANYTHING RELATED TO DIAGNOSIS     GENONOMIC TESTING     TYPE:     IMAGING (NEED IMAGES & REPORT)     CT SCANS     MRI Complete - [facility]  MRI Pelvis 7/21/2023   MAMMO     ULTRASOUND     PET

## 2023-07-26 NOTE — PROGRESS NOTES
Confirmed with Dr. Christianson 8/9 is appropriate timeframe for visit.     OUTGOING CALL to pt:  Introduced my role as nurse navigator with [facility] Hematology/Oncology dept and that we have recd the referral to GynOnc from Dr Payan  Pt confirms they are aware of the referral and ready to schedule  Provided contact information if future questions arise.     -Transferred pt to NPS line [phone] to schedule appt per scheduling instructions.      Keren Sierra, BSN, RN, PHN, OCN  Hematology/Oncology Nurse Navigator  [facility]  [phone]

## 2023-07-26 NOTE — PROGRESS NOTES
Records viewable in CE,     MRI 7/21 showing:  IMPRESSION:   1.Atypical 2.8 cm left uterine body mass. The degree of diffusion restriction is suspicious for malignancy. Recommend follow-up with gynecologic-oncology.   2.Evidence of rectal prolapse with mild widening of the levator hiatus. Limited evaluation on nondynamic imaging.   3.Uterine fibroids as detailed above.     Hold placed on Dr. Mendez's schedule 8/9, IB to Dr. Christianson to weigh in if ok to schedule 8/9 or if pt should be seen more urgently. Will follow-up pending input from team.     Keren Sierra, BSN, RN, PHN, OCN  Hematology/Oncology Nurse Navigator  [facility]  [phone]

## 2023-08-09 ENCOUNTER — ONCOLOGY VISIT (OUTPATIENT)
Dept: ONCOLOGY | Facility: CLINIC | Age: 88
End: 2023-08-09
Attending: OBSTETRICS & GYNECOLOGY
Payer: MEDICARE

## 2023-08-09 VITALS
HEIGHT: 65 IN | OXYGEN SATURATION: 96 % | SYSTOLIC BLOOD PRESSURE: 136 MMHG | BODY MASS INDEX: 36.99 KG/M2 | DIASTOLIC BLOOD PRESSURE: 77 MMHG | WEIGHT: 222 LBS | HEART RATE: 76 BPM | RESPIRATION RATE: 18 BRPM | TEMPERATURE: 97.7 F

## 2023-08-09 DIAGNOSIS — N94.89 ENDOMETRIAL MASS: Primary | ICD-10-CM

## 2023-08-09 PROCEDURE — 99204 OFFICE O/P NEW MOD 45 MIN: CPT | Mod: 57 | Performed by: OBSTETRICS & GYNECOLOGY

## 2023-08-09 PROCEDURE — G0463 HOSPITAL OUTPT CLINIC VISIT: HCPCS | Performed by: OBSTETRICS & GYNECOLOGY

## 2023-08-09 RX ORDER — LOPERAMIDE HCL 2 MG
2 CAPSULE ORAL 4 TIMES DAILY PRN
COMMUNITY

## 2023-08-09 RX ORDER — CEFAZOLIN SODIUM 2 G/50ML
2 SOLUTION INTRAVENOUS SEE ADMIN INSTRUCTIONS
Status: CANCELLED | OUTPATIENT
Start: 2023-08-09

## 2023-08-09 RX ORDER — PREDNISOLONE 5 MG/1
5 TABLET ORAL PRN
COMMUNITY

## 2023-08-09 RX ORDER — ZOLEDRONIC ACID 4 MG/5ML
4 INJECTION INTRAVENOUS ONCE
COMMUNITY

## 2023-08-09 RX ORDER — CEFAZOLIN SODIUM 2 G/50ML
2 SOLUTION INTRAVENOUS
Status: CANCELLED | OUTPATIENT
Start: 2023-08-09

## 2023-08-09 RX ORDER — METRONIDAZOLE 500 MG/100ML
500 INJECTION, SOLUTION INTRAVENOUS
Status: CANCELLED | OUTPATIENT
Start: 2023-08-09

## 2023-08-09 RX ORDER — HEPARIN SODIUM 5000 [USP'U]/.5ML
5000 INJECTION, SOLUTION INTRAVENOUS; SUBCUTANEOUS
Status: CANCELLED | OUTPATIENT
Start: 2023-08-09

## 2023-08-09 RX ORDER — ANTACID TABLETS 500 MG/1
2 TABLET, CHEWABLE ORAL 2 TIMES DAILY
COMMUNITY
Start: 2023-07-09

## 2023-08-09 RX ORDER — GUAIFENESIN AND DEXTROMETHORPHAN HYDROBROMIDE 600; 30 MG/1; MG/1
1 TABLET, EXTENDED RELEASE ORAL EVERY 12 HOURS
Status: ON HOLD | COMMUNITY
End: 2023-09-02

## 2023-08-09 ASSESSMENT — PAIN SCALES - GENERAL: PAINLEVEL: NO PAIN (0)

## 2023-08-09 NOTE — NURSING NOTE
"Oncology Rooming Note    August 9, 2023 10:39 AM   Sera Diaz is a 87 year old female who presents for:    Chief Complaint   Patient presents with    Oncology Clinic Visit     Pelvic Mass     Initial Vitals: /77   Pulse 76   Temp 97.7  F (36.5  C) (Oral)   Resp 18   Ht 1.651 m (5' 5\")   Wt 100.7 kg (222 lb)   SpO2 96%   BMI 36.94 kg/m   Estimated body mass index is 36.94 kg/m  as calculated from the following:    Height as of this encounter: 1.651 m (5' 5\").    Weight as of this encounter: 100.7 kg (222 lb). Body surface area is 2.15 meters squared.  No Pain (0) Comment: Data Unavailable   No LMP recorded. Patient is postmenopausal.  Allergies reviewed: Yes  Medications reviewed: Yes    Medications: Medication refills not needed today.  Pharmacy name entered into kinkon: [id number] - [place], [place] -  [address]    Clinical concerns:  New pt consult.      Miladis Lynn CMA              "

## 2023-08-09 NOTE — PATIENT INSTRUCTIONS
It was a pleasure seeing you in clinic today-please reach out if there are any further questions that arise following today's visit.  During business hours, you may reach me at [phone].  For urgent/emergent questions after business hours, you may reach the on-call Gynecologic Oncology Resident through the [facility]  at [phone].    All normal test results are usually communicated via letter or [facility] message.  Abnormal results (those that require a change in the previously discussed plan of care) are usually communicated via a phone call.    I recommend signing up for indenit access if you have not already done so.  This allows you online access to your lab results and also helps you communicate efficiently with your clinic should any questions arise in your care.        You will be/have been scheduled for surgery     Diagnosis:  Ovarian mass       The surgical procedure is: Total laparoscopic hysterectomy, bilateral salpingo-oophorectomy, possible lymph node removal and staging     Anticipated length of surgery: .  You will be in the recovery room for approximately 2-4hrs after surgery.      You will be going home the same day  At discharge you will need a someone to drive you home.  You will need someone to stay with you for the first 24 hours you are home.    Preparation for Surgery:    To Schedule               *  Schedule a preoperative visit with cardiologist for     preoperative clearance for surgery             *  No solid foods or milk products 10 hours before your surgery time, you can  have clear liquids up until 4 hours before your surgery time.    Postoperative Restrictions:  No heavy lifting >20 lbs or strenuous              activity                        nothing in the vagina (no tampons, no intercourse, no douching) for eight weeks.     Postoperative plan  Decreased appetite is normal, plan to eat 6-8 small meal day, drink at least 6-8 glasses of water per day, there  are no dietary restrictions.   Take stool softener daily as directed  for at least 1-2 weeks unless you develop diarrhea.  Activity as tolerated, reminder to balance rest with activity as you will tire easily while recovering. Using stairs is ok. Walk as much as tolerated, increasing your activity every day.   You may shower the day after surgery, your incision site can get wet/soapy, pat dry.    You will be given ibuprofen and tylenol, take on schedule every 6 hours.  Do not set an alarm to wake yourself up to take the pain medications.  Take consistently for a week  then you can decrease/stop as tolerated.  You will also be given a small dose of narcotics, you may take this in addition to the tylenol/ibuprofen as needed if the pain is not manageable.  do not take this on a schedule.    Ok to resume driving a week or so after surgery after discontinue of narcotics  Wound care:  you may cover your incision sites if there is drainage or clothing is causing irritation otherwise leave open to the air.  No need to put an creams/ointments on incision site.  Wound will be mildly pink and might have a firm ridge underneath this is normal and will resolve in 4-6 weeks      Call clinic if you have fever greater than 100.5, heavy vaginal bleeding, persistent nausea/vomiting, increasing redness, pain, swelling at incision site, drainage with bad odor or other concerns.      You should be feeling better every day.    Postoperative visit:  Return to clinic 1-2 weeks after surgery for post operative visit.  Will discuss your pathology results and any needed follow up plan at that visit.      Please contact the clinic with any questions or concerns.     Dr Eyal Kim RN  Phone:  [phone]  Fax:  [phone]

## 2023-08-09 NOTE — LETTER
2023         RE: Sera Diaz  [address]  [place] [address]        Dear Colleague,    Thank you for referring your patient, Sera Diaz, to the [facility]. Please see a copy of my visit note below.    Gynecologic Oncology Clinic - New Patient    Referring provider:    Yashira Payan MD  [address] [address]    Patient: Sera Diaz  : 1935    Date of Visit: Aug 9, 2023     Reason for visit: endometrial mass    History of Present Illness:  Sera Diaz is a 87 year old patient With past medical history notable for mitral valve stenosis, asthma, and REM sleep disorder who presents due to finding of endometrial mass during work-up of pelvic floor issues.     She sees urogynecology for incontinence and fecal incontinence per documentation she doesn't have any signifciant prolapse. Had MRI at their recommendation for further work-up which incidentally noted an endometrial/uterine mass. Lost her daughter to uterine carcinosarcoma a few years ago. No vaginal bleeding currently or previously. No new sypmtoms.     Has seen cardiology and had extensive work-up of cardiac conditions. See other notes for full details.     Family History  Brother prostate  Sister: liver, pancreas  Daughter: uterine carcinosarcoma  Dad: kidney cancer  Mom: colon cancer  (: pancreatic)    OB/Gynecologic History:  Vaginal deliveries. No HRT. Fibroid uterus.   Incontinence - urinary and fecal.    Past Medical History:   Diagnosis Date    Asthma     Gastroesophageal reflux disease with esophagitis     HTN (hypertension)     Mitral valve prolapse     REM sleep disorder        Past Surgical History:   Procedure Laterality Date    EYE SURGERY      OPEN REDUCTION INTERNAL FIXATION ANKLE      TUBAL LIGATION            Social History     Tobacco Use    Smoking status: Former     Types: Cigarettes     Passive exposure: Past    Smokeless tobacco:  Never    Tobacco comments:     Stopped in 1991, smoked for 42 years.   Substance Use Topics    Alcohol use: Yes     Comment: rare    Drug use: Never       No family history on file.    Current Outpatient Medications   Medication    BETAMETHASONE PO    BRYCE-GEST ANTACID 500 MG chewable tablet    cholecalciferol 25 MCG (1000 UT) TABS    dextromethorphan-guaiFENesin (MUCINEX DM)  MG 12 hr tablet    diazepam (VALIUM) 2 MG tablet    docosanol (ABREVA) 10 % CREA cream    fluticasone (FLONASE) 50 MCG/ACT nasal spray    levalbuterol (XOPENEX HFA) 45 MCG/ACT inhaler    loperamide (IMODIUM) 2 MG capsule    mometasone (ASMANEX TWISTHALER) 220 MCG/ACT inhaler    montelukast (SINGULAIR) 10 MG tablet    Multiple Vitamins-Minerals (MULTIVITAMIN ADULTS 50+ PO)    Multiple Vitamins-Minerals (PRESERVISION AREDS PO)    nystatin (MYCOSTATIN) 508545 UNIT/GM external cream    omeprazole (PRILOSEC) 20 MG DR capsule    prednisoLONE 5 MG tablet    predniSONE (DELTASONE) 5 MG tablet    Propylene Glycol (SYSTANE COMPLETE OP)    simvastatin (ZOCOR) 10 MG tablet    triamterene-HCTZ (DYAZIDE) 37.5-25 MG capsule    valACYclovir (VALTREX) 1000 mg tablet    zoledronic acid (ZOMETA) 4 MG/5ML injection    amLODIPine (NORVASC) 2.5 MG tablet    [facility] MUCUS EXTENDED RELEASE 600 MG 12 hr tablet     Current Facility-Administered Medications   Medication    triamcinolone (KENALOG-40) injection 12 mg       Allergies  Allergies   Allergen Reactions    Sulfa Antibiotics Other (See Comments) and Rash     Face swells      Iodine Rash    Oxybutynin Other (See Comments)     Severe dry mouth    Mirabegron      Other reaction(s): Dizziness       Current Outpatient Medications   Medication    BETAMETHASONE PO    BRYCE-GEST ANTACID 500 MG chewable tablet    cholecalciferol 25 MCG (1000 UT) TABS    dextromethorphan-guaiFENesin (MUCINEX DM)  MG 12 hr tablet    diazepam (VALIUM) 2 MG tablet    docosanol (ABREVA) 10 % CREA cream    fluticasone (FLONASE) 50 MCG/ACT  "nasal spray    levalbuterol (XOPENEX HFA) 45 MCG/ACT inhaler    loperamide (IMODIUM) 2 MG capsule    mometasone (ASMANEX TWISTHALER) 220 MCG/ACT inhaler    montelukast (SINGULAIR) 10 MG tablet    Multiple Vitamins-Minerals (MULTIVITAMIN ADULTS 50+ PO)    Multiple Vitamins-Minerals (PRESERVISION AREDS PO)    nystatin (MYCOSTATIN) 297461 UNIT/GM external cream    omeprazole (PRILOSEC) 20 MG DR capsule    prednisoLONE 5 MG tablet    predniSONE (DELTASONE) 5 MG tablet    Propylene Glycol (SYSTANE COMPLETE OP)    simvastatin (ZOCOR) 10 MG tablet    triamterene-HCTZ (DYAZIDE) 37.5-25 MG capsule    valACYclovir (VALTREX) 1000 mg tablet    zoledronic acid (ZOMETA) 4 MG/5ML injection    amLODIPine (NORVASC) 2.5 MG tablet    [facility] MUCUS EXTENDED RELEASE 600 MG 12 hr tablet     Current Facility-Administered Medications   Medication    triamcinolone (KENALOG-40) injection 12 mg       Physical Exam:   /77   Pulse 76   Temp 97.7  F (36.5  C) (Oral)   Resp 18   Ht 1.651 m (5' 5\")   Wt 100.7 kg (222 lb)   SpO2 96%   BMI 36.94 kg/m    Deferred    Labs/Pathology:   7/11/23  Hgb A1c 6.0  CBC wnl    Imaging:   MR PELVIS W/O + W/CONTRAST  Order: [id number]  Impression    IMPRESSION:  1.Atypical 2.8 cm left uterine body mass. The degree of diffusion restriction is suspicious for malignancy. Recommend follow-up with gynecologic-oncology.  2.Evidence of rectal prolapse with mild widening of the levator hiatus. Limited evaluation on nondynamic imaging.  3.Uterine fibroids as detailed above.    I have personally reviewed the image(s) and initial interpretation, and I agree with the findings as documented by the resident/fellow.      Reading Radiologist: Judy Lloyd  Reading Resident: Kristin Clark  Narrative    EXAMINATION: MR PELVIS W/O + W/CONTRAST    COMPARISON: Pelvic ultrasound 8/31/2020    HISTORY: Urinary dysfunction (Female), suspected pelvic floor prolapse    TECHNIQUE: Multiplanar, multisequence imaging was " obtained of the pelvis without and with intravenous contrast.    FINDINGS:    Uterus:  Version: Anteverted  Measures 8 x 6 x 4 cm  Endomentrial thickness is 2 mm.  Nabothian cysts.  The junctional zone is not well evaluated.    Submucosal and intramural cysts in the right uterine body abutting 1.8 x 2.1 x 2.2 cm submucosal fibroid.    2.8 x 2.3 cm T2 heterogeneous, circumscribed, enhancing nodule in the intramural, left mid uterine body (series 501, image 40) demonstrating marked internal restricted diffusion greater than that of the endometrium and ADC hypointensity.    Intramural, subcentimeter uterine fundal fibroid.    Ovaries:  Normal size and appearance for patient age.  The right ovary measures 2 x 1 x 1 cm.  The left ovary measures 2 x 1 x 1 cm. Subcentimeter T2 hyperintense, thin-walled cysts.    Urinary bladder:  Partially decompressed without focal masses.    Pelvic fluid:  No    Lymph nodes:  No lymphadenopathy    Bones:  No significant osseous abnormality. Trace bilateral hip effusions.    Other structures of the pelvis:  Inferior displacement of the anorectal junction measuring 3.2 cm (sagittal series 301, image 20). Associated thickening of the low and mid rectum.  No convincing rectocele.  No cystocele.  No uterine prolapse.  Mild widening of the levator hiatus (H line) measuring 6.3 cm.  Mild atrophy of the pelvic floor musculature.      Assessment:  Sera Diaz is a 87 year old patient with PMH notable for mitral valve prolapse (mild), HTN, asthma, presenting due to incidentally noted endometrial nodule.    Plan:   - We reviewed her imaging and options for management. Given discrete nodule I don't think in office biopsy is likely to be fruitful. Offered options of hysteroscopy to obtain diagnosis which could potentially avoid need for hysterectomy vs proceeding to hysterectomy. She would like to proceed with hysterectomy. Discussed procedure, risks, benefits. Paritculary with other health  conditions. Discussed the use of frozen pathology to give a preliminary diagnosis. This will allow staging if indicated. She is amenable with this plan.     - I did inquire whether Urogyn has ever recommended surgery. In talking to her about her symptoms, reviewing their documentation, and discussing with the patient, it does not seem there would likely be surgical intervention such that we should delay her surgery for the purpose of combined procedure.     We will proceed with Total laparoscopic hysterectomy, bilateral salpingo-oophorectomy, possible staging based on frozen pathology.     - I have personally reviewed any labs/pathology or imaging reports that I have copied above.     Tono Mendez MD     Gynecologic Oncology

## 2023-08-09 NOTE — NURSING NOTE
Pre Op Nurse Teaching Template    Relevant Diagnosis: pelvic mass    Teaching Topic: Total laparoscopic hysterectomy, bilateral salpingo-oophorectomy, possible lymph node removal and staging     Person(s) involved in teaching :  Patient  & daughter  Motivation Level:  Asks Questions:    Yes      Eager to Learn:     Yes     Cooperative:          Yes    Receptive (willing. Able to accept information):    Yes      Patient and those who are listed above demonstrates understanding of the following:   Reason for the appointment, diagnosis and treatment plan:   Yes   Knowledge of proper use of medications and conditions for which they are ordered (with special attention to potential side effects or drug interactions): Yes   Which situations necessitate calling provider and whom to contact: Yes         Nutritional needs and diet plan:  Yes      Pain management techniques:     Yes, Pain Scale   Diet:   Yes, [facility] Diet Instructions    Teaching Concerns addressed: Yes    Infection Prevention:  Patient and those who are listed above demonstrate understanding of the following:  Pre-Op CHG Bathing Instructions: Yes  Surgical procedure site care taught:   Yes   Signs and symptoms of infection taught: Yes       Instructional Materials Used/Given:  The [place] Before You Surgery Booklet  Showering or Bathing before Surgery Instructions & CHG Product  Hysterectomy Guidelines  Pain Assessment Tool   Home Care after Major Abdominal or Vaginal Surgery  Map  Accommodations Brochure  Phone numbers for [facility] and [facility]  Copy of Surgical Consent    Done Today:  Lab work, EKG, Chest Xray,   Preop Visit needed with cardiology  Tests Ordered:  Post Op Visit Scheduled:TBD  Comments:    Surgery date/time:TBD        Consent signed via IPAD and on file in media  Hysterectomy consent signed and sent to scanning  .

## 2023-08-09 NOTE — PROGRESS NOTES
Gynecologic Oncology Clinic - New Patient    Referring provider:    Yashira Payan MD  [address] [address]    Patient: Sera Diaz  : 1935    Date of Visit: Aug 9, 2023     Reason for visit: endometrial mass    History of Present Illness:  Sear Diaz is a 87 year old patient With past medical history notable for mitral valve stenosis, asthma, and REM sleep disorder who presents due to finding of endometrial mass during work-up of pelvic floor issues.     She sees urogynecology for incontinence and fecal incontinence per documentation she doesn't have any signifciant prolapse. Had MRI at their recommendation for further work-up which incidentally noted an endometrial/uterine mass. Lost her daughter to uterine carcinosarcoma a few years ago. No vaginal bleeding currently or previously. No new sypmtoms.     Has seen cardiology and had extensive work-up of cardiac conditions. See other notes for full details.     Family History  Brother prostate  Sister: liver, pancreas  Daughter: uterine carcinosarcoma  Dad: kidney cancer  Mom: colon cancer  (: pancreatic)    OB/Gynecologic History:  Vaginal deliveries. No HRT. Fibroid uterus.   Incontinence - urinary and fecal.    Past Medical History:   Diagnosis Date    Asthma     Gastroesophageal reflux disease with esophagitis     HTN (hypertension)     Mitral valve prolapse     REM sleep disorder        Past Surgical History:   Procedure Laterality Date    EYE SURGERY      OPEN REDUCTION INTERNAL FIXATION ANKLE      TUBAL LIGATION            Social History     Tobacco Use    Smoking status: Former     Types: Cigarettes     Passive exposure: Past    Smokeless tobacco: Never    Tobacco comments:     Stopped in , smoked for 42 years.   Substance Use Topics    Alcohol use: Yes     Comment: rare    Drug use: Never       No family history on file.    Current Outpatient Medications   Medication    BETAMETHASONE PO    BRYCE-GEST  ANTACID 500 MG chewable tablet    cholecalciferol 25 MCG (1000 UT) TABS    dextromethorphan-guaiFENesin (MUCINEX DM)  MG 12 hr tablet    diazepam (VALIUM) 2 MG tablet    docosanol (ABREVA) 10 % CREA cream    fluticasone (FLONASE) 50 MCG/ACT nasal spray    levalbuterol (XOPENEX HFA) 45 MCG/ACT inhaler    loperamide (IMODIUM) 2 MG capsule    mometasone (ASMANEX TWISTHALER) 220 MCG/ACT inhaler    montelukast (SINGULAIR) 10 MG tablet    Multiple Vitamins-Minerals (MULTIVITAMIN ADULTS 50+ PO)    Multiple Vitamins-Minerals (PRESERVISION AREDS PO)    nystatin (MYCOSTATIN) 155548 UNIT/GM external cream    omeprazole (PRILOSEC) 20 MG DR capsule    prednisoLONE 5 MG tablet    predniSONE (DELTASONE) 5 MG tablet    Propylene Glycol (SYSTANE COMPLETE OP)    simvastatin (ZOCOR) 10 MG tablet    triamterene-HCTZ (DYAZIDE) 37.5-25 MG capsule    valACYclovir (VALTREX) 1000 mg tablet    zoledronic acid (ZOMETA) 4 MG/5ML injection    amLODIPine (NORVASC) 2.5 MG tablet    [facility] MUCUS EXTENDED RELEASE 600 MG 12 hr tablet     Current Facility-Administered Medications   Medication    triamcinolone (KENALOG-40) injection 12 mg       Allergies  Allergies   Allergen Reactions    Sulfa Antibiotics Other (See Comments) and Rash     Face swells      Iodine Rash    Oxybutynin Other (See Comments)     Severe dry mouth    Mirabegron      Other reaction(s): Dizziness       Current Outpatient Medications   Medication    BETAMETHASONE PO    BRYCE-GEST ANTACID 500 MG chewable tablet    cholecalciferol 25 MCG (1000 UT) TABS    dextromethorphan-guaiFENesin (MUCINEX DM)  MG 12 hr tablet    diazepam (VALIUM) 2 MG tablet    docosanol (ABREVA) 10 % CREA cream    fluticasone (FLONASE) 50 MCG/ACT nasal spray    levalbuterol (XOPENEX HFA) 45 MCG/ACT inhaler    loperamide (IMODIUM) 2 MG capsule    mometasone (ASMANEX TWISTHALER) 220 MCG/ACT inhaler    montelukast (SINGULAIR) 10 MG tablet    Multiple Vitamins-Minerals (MULTIVITAMIN ADULTS 50+ PO)     "Multiple Vitamins-Minerals (PRESERVISION AREDS PO)    nystatin (MYCOSTATIN) 847157 UNIT/GM external cream    omeprazole (PRILOSEC) 20 MG DR capsule    prednisoLONE 5 MG tablet    predniSONE (DELTASONE) 5 MG tablet    Propylene Glycol (SYSTANE COMPLETE OP)    simvastatin (ZOCOR) 10 MG tablet    triamterene-HCTZ (DYAZIDE) 37.5-25 MG capsule    valACYclovir (VALTREX) 1000 mg tablet    zoledronic acid (ZOMETA) 4 MG/5ML injection    amLODIPine (NORVASC) 2.5 MG tablet    [facility] MUCUS EXTENDED RELEASE 600 MG 12 hr tablet     Current Facility-Administered Medications   Medication    triamcinolone (KENALOG-40) injection 12 mg       Physical Exam:   /77   Pulse 76   Temp 97.7  F (36.5  C) (Oral)   Resp 18   Ht 1.651 m (5' 5\")   Wt 100.7 kg (222 lb)   SpO2 96%   BMI 36.94 kg/m    Deferred    Labs/Pathology:   7/11/23  Hgb A1c 6.0  CBC wnl    Imaging:   MR PELVIS W/O + W/CONTRAST  Order: [id number]  Impression    IMPRESSION:  1.Atypical 2.8 cm left uterine body mass. The degree of diffusion restriction is suspicious for malignancy. Recommend follow-up with gynecologic-oncology.  2.Evidence of rectal prolapse with mild widening of the levator hiatus. Limited evaluation on nondynamic imaging.  3.Uterine fibroids as detailed above.    I have personally reviewed the image(s) and initial interpretation, and I agree with the findings as documented by the resident/fellow.      Reading Radiologist: Judy Lloyd  Reading Resident: Kristin Clark  Narrative    EXAMINATION: MR PELVIS W/O + W/CONTRAST    COMPARISON: Pelvic ultrasound 8/31/2020    HISTORY: Urinary dysfunction (Female), suspected pelvic floor prolapse    TECHNIQUE: Multiplanar, multisequence imaging was obtained of the pelvis without and with intravenous contrast.    FINDINGS:    Uterus:  Version: Anteverted  Measures 8 x 6 x 4 cm  Endomentrial thickness is 2 mm.  Nabothian cysts.  The junctional zone is not well evaluated.    Submucosal and intramural " cysts in the right uterine body abutting 1.8 x 2.1 x 2.2 cm submucosal fibroid.    2.8 x 2.3 cm T2 heterogeneous, circumscribed, enhancing nodule in the intramural, left mid uterine body (series 501, image 40) demonstrating marked internal restricted diffusion greater than that of the endometrium and ADC hypointensity.    Intramural, subcentimeter uterine fundal fibroid.    Ovaries:  Normal size and appearance for patient age.  The right ovary measures 2 x 1 x 1 cm.  The left ovary measures 2 x 1 x 1 cm. Subcentimeter T2 hyperintense, thin-walled cysts.    Urinary bladder:  Partially decompressed without focal masses.    Pelvic fluid:  No    Lymph nodes:  No lymphadenopathy    Bones:  No significant osseous abnormality. Trace bilateral hip effusions.    Other structures of the pelvis:  Inferior displacement of the anorectal junction measuring 3.2 cm (sagittal series 301, image 20). Associated thickening of the low and mid rectum.  No convincing rectocele.  No cystocele.  No uterine prolapse.  Mild widening of the levator hiatus (H line) measuring 6.3 cm.  Mild atrophy of the pelvic floor musculature.      Assessment:  Sera Diaz is a 87 year old patient with PMH notable for mitral valve prolapse (mild), HTN, asthma, presenting due to incidentally noted endometrial nodule.    Plan:   - We reviewed her imaging and options for management. Given discrete nodule I don't think in office biopsy is likely to be fruitful. Offered options of hysteroscopy to obtain diagnosis which could potentially avoid need for hysterectomy vs proceeding to hysterectomy. She would like to proceed with hysterectomy. Discussed procedure, risks, benefits. Paritculary with other health conditions. Discussed the use of frozen pathology to give a preliminary diagnosis. This will allow staging if indicated. She is amenable with this plan.     - I did inquire whether Urogyn has ever recommended surgery. In talking to her about her symptoms,  reviewing their documentation, and discussing with the patient, it does not seem there would likely be surgical intervention such that we should delay her surgery for the purpose of combined procedure.     We will proceed with Total laparoscopic hysterectomy, bilateral salpingo-oophorectomy, possible staging based on frozen pathology.     - I have personally reviewed any labs/pathology or imaging reports that I have copied above.     Tono Mendez MD     Gynecologic Oncology

## 2023-08-10 ENCOUNTER — TELEPHONE (OUTPATIENT)
Dept: ONCOLOGY | Facility: CLINIC | Age: 88
End: 2023-08-10
Payer: MEDICARE

## 2023-08-10 NOTE — TELEPHONE ENCOUNTER
KYRIE Kim called patient to schedule surgery with: Dr. Mendez     Surgery Date: 9/29/23     Location: [facility]    H&P: to be completed by Primary Care team; patient to schedule     Post-op: will be scheduled by the clinic    Patient will receive a phone call from pre-admission nurses 1-2 days prior to surgery with arrival and start time.    Patient aware times are subject to change up until day before surgery.     Patient questions/concerns: N/A     Surgery packet was provided to patient during appointment      Tonja Doss on 8/10/2023 at 11:43 AM

## 2023-08-11 NOTE — TELEPHONE ENCOUNTER
Patient spoke with KYRIE Kim and asked to have surgery sooner if possible. Writer was able to to reschedule for 9/1 at [facility]. Writer routing this information to DARLIN Doss on 8/11/2023 at 11:41 AM

## 2023-08-31 ENCOUNTER — ANESTHESIA EVENT (OUTPATIENT)
Dept: SURGERY | Facility: CLINIC | Age: 88
End: 2023-08-31
Payer: MEDICARE

## 2023-08-31 RX ORDER — OXYCODONE HYDROCHLORIDE 5 MG/1
5 TABLET ORAL
Status: CANCELLED | OUTPATIENT
Start: 2023-08-31

## 2023-08-31 RX ORDER — OXYCODONE HYDROCHLORIDE 10 MG/1
10 TABLET ORAL
Status: CANCELLED | OUTPATIENT
Start: 2023-08-31

## 2023-08-31 RX ORDER — HALOPERIDOL 5 MG/ML
1 INJECTION INTRAMUSCULAR
Status: CANCELLED | OUTPATIENT
Start: 2023-08-31

## 2023-08-31 RX ORDER — ACETAMINOPHEN 325 MG/1
975 TABLET ORAL ONCE
Status: CANCELLED | OUTPATIENT
Start: 2023-08-31 | End: 2023-08-31

## 2023-08-31 RX ORDER — ONDANSETRON 4 MG/1
4 TABLET, ORALLY DISINTEGRATING ORAL EVERY 30 MIN PRN
Status: CANCELLED | OUTPATIENT
Start: 2023-08-31

## 2023-08-31 RX ORDER — FENTANYL CITRATE 50 UG/ML
25 INJECTION, SOLUTION INTRAMUSCULAR; INTRAVENOUS
Status: CANCELLED | OUTPATIENT
Start: 2023-08-31

## 2023-08-31 RX ORDER — ONDANSETRON 2 MG/ML
4 INJECTION INTRAMUSCULAR; INTRAVENOUS EVERY 30 MIN PRN
Status: CANCELLED | OUTPATIENT
Start: 2023-08-31

## 2023-08-31 ASSESSMENT — COPD QUESTIONNAIRES
CAT_SEVERITY: SEVERE
COPD: 1

## 2023-08-31 ASSESSMENT — ENCOUNTER SYMPTOMS: DYSRHYTHMIAS: 1

## 2023-08-31 NOTE — ANESTHESIA PREPROCEDURE EVALUATION
Anesthesia Pre-Procedure Evaluation    Patient: Sera Diaz   MRN: [id number] : 1935        Procedure : Procedure(s):  Total laparoscopic hysterectomy, bilateral salpingo-oophorectomy, possible lymph node removal and staging          History reviewed. No pertinent past medical history.   History reviewed. No pertinent surgical history.   Allergies   Allergen Reactions    Sulfa Antibiotics Other (See Comments) and Rash     Face swells      Iodine Rash    Oxybutynin Other (See Comments)     Severe dry mouth    Mirabegron      Other reaction(s): Dizziness      Social History     Tobacco Use    Smoking status: Former     Types: Cigarettes     Passive exposure: Past    Smokeless tobacco: Never    Tobacco comments:     Stopped in , smoked for 42 years.   Substance Use Topics    Alcohol use: Yes     Comment: rare      Wt Readings from Last 1 Encounters:   23 100.7 kg (222 lb)        Anesthesia Evaluation   Pt has had prior anesthetic. Type: General.    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    Previous cardiac testing   Echo: Date: 23 Results:  EF 65%  Pa pressure 38  Mild mitral stenosis   Stress Test:  Date: Results:    ECG Reviewed:  Date: Results:    Cath:  Date: Results:      METS/Exercise Tolerance:     Hematologic:  - neg hematologic  ROS     Musculoskeletal: Comment: Trochanteric bursitis       GI/Hepatic:     (+) GERD,                   Renal/Genitourinary:       Endo:     (+)            Chronic steroid usage for COPD.         Psychiatric/Substance Use:  - neg psychiatric ROS   (+)   alcohol abuse      Infectious Disease:  - neg infectious disease ROS     Malignancy: Comment: Hx melanoma       Other:            Physical Exam    Airway        Mallampati: II   TM distance: > 3 FB   Neck ROM: full   Mouth opening: > 3 cm    Respiratory Devices and Support         Dental     Comment: Patient reports no loose or chipped teeth        Cardiovascular          Rhythm and rate: regular and normal   (+) murmur       Pulmonary           breath sounds clear to auscultation           OUTSIDE LABS:  CBC: No results found for: WBC, HGB, HCT, PLT  BMP: No results found for: NA, POTASSIUM, CHLORIDE, CO2, BUN, CR, GLC  COAGS: No results found for: PTT, INR, FIBR  POC: No results found for: BGM, HCG, HCGS  HEPATIC: No results found for: ALBUMIN, PROTTOTAL, ALT, AST, GGT, ALKPHOS, BILITOTAL, BILIDIRECT, MARLY  OTHER: No results found for: PH, LACT, A1C, BRYCE, PHOS, MAG, LIPASE, AMYLASE, TSH, T4, T3, CRP, SED    Anesthesia Plan    ASA Status:  3    NPO Status:  NPO Appropriate    Anesthesia Type: General.     - Airway: ETT   Induction: Intravenous.   Maintenance: Inhalation.   Techniques and Equipment:     - Lines/Monitors: BIS, 2nd IV     Consents    Anesthesia Plan(s) and associated risks, benefits, and realistic alternatives discussed. Questions answered and patient/representative(s) expressed understanding.     - Discussed:     - Discussed with:  Patient      - Extended Intubation/Ventilatory Support Discussed: No.      - Patient is DNR/DNI Status: No     Use of blood products discussed: Yes.     - Discussed with: Patient.     - Consented: consented to blood products            Reason for refusal: other.     Postoperative Care    Pain management: IV analgesics, Oral pain medications, Multi-modal analgesia, Peripheral  nerve block (Single Shot).   PONV prophylaxis: Ondansetron (or other 5HT-3), Dexamethasone or Solumedrol     Comments:                Luba Torres MD

## 2023-09-01 ENCOUNTER — ALLIED HEALTH/NURSE VISIT (OUTPATIENT)
Dept: ONCOLOGY | Facility: CLINIC | Age: 88
End: 2023-09-01

## 2023-09-01 ENCOUNTER — HOSPITAL ENCOUNTER (OUTPATIENT)
Facility: CLINIC | Age: 88
Discharge: HOME | End: 2023-09-02
Attending: OBSTETRICS & GYNECOLOGY | Admitting: OBSTETRICS & GYNECOLOGY
Payer: MEDICARE

## 2023-09-01 ENCOUNTER — ANESTHESIA (OUTPATIENT)
Dept: SURGERY | Facility: CLINIC | Age: 88
End: 2023-09-01
Payer: MEDICARE

## 2023-09-01 DIAGNOSIS — G89.18 POST-OPERATIVE PAIN: Primary | ICD-10-CM

## 2023-09-01 DIAGNOSIS — N94.89 ENDOMETRIAL MASS: Primary | ICD-10-CM

## 2023-09-01 LAB
ABO/RH(D): NORMAL
ANTIBODY SCREEN: NEGATIVE
SPECIMEN EXPIRATION DATE: NORMAL

## 2023-09-01 PROCEDURE — 370N000017 HC ANESTHESIA TECHNICAL FEE, PER MIN: Performed by: OBSTETRICS & GYNECOLOGY

## 2023-09-01 PROCEDURE — 250N000011 HC RX IP 250 OP 636: Performed by: OBSTETRICS & GYNECOLOGY

## 2023-09-01 PROCEDURE — 258N000003 HC RX IP 258 OP 636: Performed by: NURSE ANESTHETIST, CERTIFIED REGISTERED

## 2023-09-01 PROCEDURE — 250N000013 HC RX MED GY IP 250 OP 250 PS 637: Performed by: STUDENT IN AN ORGANIZED HEALTH CARE EDUCATION/TRAINING PROGRAM

## 2023-09-01 PROCEDURE — 99024 POSTOP FOLLOW-UP VISIT: CPT | Performed by: STUDENT IN AN ORGANIZED HEALTH CARE EDUCATION/TRAINING PROGRAM

## 2023-09-01 PROCEDURE — 250N000013 HC RX MED GY IP 250 OP 250 PS 637: Performed by: ANESTHESIOLOGY

## 2023-09-01 PROCEDURE — 120N000002 HC R&B MED SURG/OB UMMC

## 2023-09-01 PROCEDURE — 250N000013 HC RX MED GY IP 250 OP 250 PS 637

## 2023-09-01 PROCEDURE — 250N000011 HC RX IP 250 OP 636: Performed by: STUDENT IN AN ORGANIZED HEALTH CARE EDUCATION/TRAINING PROGRAM

## 2023-09-01 PROCEDURE — 250N000011 HC RX IP 250 OP 636

## 2023-09-01 PROCEDURE — 258N000003 HC RX IP 258 OP 636: Performed by: STUDENT IN AN ORGANIZED HEALTH CARE EDUCATION/TRAINING PROGRAM

## 2023-09-01 PROCEDURE — 999N000141 HC STATISTIC PRE-PROCEDURE NURSING ASSESSMENT: Performed by: OBSTETRICS & GYNECOLOGY

## 2023-09-01 PROCEDURE — 710N000010 HC RECOVERY PHASE 1, LEVEL 2, PER MIN: Performed by: OBSTETRICS & GYNECOLOGY

## 2023-09-01 PROCEDURE — 250N000009 HC RX 250

## 2023-09-01 PROCEDURE — 88331 PATH CONSLTJ SURG 1 BLK 1SPC: CPT | Mod: TC | Performed by: OBSTETRICS & GYNECOLOGY

## 2023-09-01 PROCEDURE — 88307 TISSUE EXAM BY PATHOLOGIST: CPT | Mod: 26 | Performed by: STUDENT IN AN ORGANIZED HEALTH CARE EDUCATION/TRAINING PROGRAM

## 2023-09-01 PROCEDURE — 250N000011 HC RX IP 250 OP 636: Mod: JZ | Performed by: ANESTHESIOLOGY

## 2023-09-01 PROCEDURE — 250N000025 HC SEVOFLURANE, PER MIN: Performed by: OBSTETRICS & GYNECOLOGY

## 2023-09-01 PROCEDURE — 88342 IMHCHEM/IMCYTCHM 1ST ANTB: CPT | Mod: 26 | Performed by: STUDENT IN AN ORGANIZED HEALTH CARE EDUCATION/TRAINING PROGRAM

## 2023-09-01 PROCEDURE — 250N000009 HC RX 250: Performed by: ANESTHESIOLOGY

## 2023-09-01 PROCEDURE — C9290 INJ, BUPIVACAINE LIPOSOME: HCPCS

## 2023-09-01 PROCEDURE — 86901 BLOOD TYPING SEROLOGIC RH(D): CPT | Performed by: OBSTETRICS & GYNECOLOGY

## 2023-09-01 PROCEDURE — 36415 COLL VENOUS BLD VENIPUNCTURE: CPT | Performed by: OBSTETRICS & GYNECOLOGY

## 2023-09-01 PROCEDURE — 88331 PATH CONSLTJ SURG 1 BLK 1SPC: CPT | Mod: 26 | Performed by: PATHOLOGY

## 2023-09-01 PROCEDURE — 250N000011 HC RX IP 250 OP 636: Mod: JZ

## 2023-09-01 PROCEDURE — 250N000011 HC RX IP 250 OP 636: Performed by: NURSE ANESTHETIST, CERTIFIED REGISTERED

## 2023-09-01 PROCEDURE — 360N000077 HC SURGERY LEVEL 4, PER MIN: Performed by: OBSTETRICS & GYNECOLOGY

## 2023-09-01 PROCEDURE — 272N000001 HC OR GENERAL SUPPLY STERILE: Performed by: OBSTETRICS & GYNECOLOGY

## 2023-09-01 PROCEDURE — 86850 RBC ANTIBODY SCREEN: CPT | Performed by: OBSTETRICS & GYNECOLOGY

## 2023-09-01 RX ORDER — NALOXONE HYDROCHLORIDE 0.4 MG/ML
0.2 INJECTION, SOLUTION INTRAMUSCULAR; INTRAVENOUS; SUBCUTANEOUS
Status: DISCONTINUED | OUTPATIENT
Start: 2023-09-01 | End: 2023-09-02 | Stop reason: HOSPADM

## 2023-09-01 RX ORDER — NALOXONE HYDROCHLORIDE 0.4 MG/ML
0.4 INJECTION, SOLUTION INTRAMUSCULAR; INTRAVENOUS; SUBCUTANEOUS
Status: DISCONTINUED | OUTPATIENT
Start: 2023-09-01 | End: 2023-09-02 | Stop reason: HOSPADM

## 2023-09-01 RX ORDER — FENTANYL CITRATE 50 UG/ML
25-50 INJECTION, SOLUTION INTRAMUSCULAR; INTRAVENOUS
Status: DISCONTINUED | OUTPATIENT
Start: 2023-09-01 | End: 2023-09-01 | Stop reason: HOSPADM

## 2023-09-01 RX ORDER — ACETAMINOPHEN 325 MG/1
975 TABLET ORAL ONCE
Status: COMPLETED | OUTPATIENT
Start: 2023-09-01 | End: 2023-09-01

## 2023-09-01 RX ORDER — HYDROMORPHONE HCL IN WATER/PF 6 MG/30 ML
0.4 PATIENT CONTROLLED ANALGESIA SYRINGE INTRAVENOUS EVERY 5 MIN PRN
Status: DISCONTINUED | OUTPATIENT
Start: 2023-09-01 | End: 2023-09-01 | Stop reason: HOSPADM

## 2023-09-01 RX ORDER — HEPARIN SODIUM 5000 [USP'U]/.5ML
5000 INJECTION, SOLUTION INTRAVENOUS; SUBCUTANEOUS
Status: COMPLETED | OUTPATIENT
Start: 2023-09-01 | End: 2023-09-01

## 2023-09-01 RX ORDER — ONDANSETRON 2 MG/ML
INJECTION INTRAMUSCULAR; INTRAVENOUS PRN
Status: DISCONTINUED | OUTPATIENT
Start: 2023-09-01 | End: 2023-09-01

## 2023-09-01 RX ORDER — SIMETHICONE 80 MG
80 TABLET,CHEWABLE ORAL 4 TIMES DAILY PRN
Status: DISCONTINUED | OUTPATIENT
Start: 2023-09-01 | End: 2023-09-02 | Stop reason: HOSPADM

## 2023-09-01 RX ORDER — METRONIDAZOLE 500 MG/100ML
500 INJECTION, SOLUTION INTRAVENOUS
Status: COMPLETED | OUTPATIENT
Start: 2023-09-01 | End: 2023-09-01

## 2023-09-01 RX ORDER — AMOXICILLIN 250 MG
2 CAPSULE ORAL 2 TIMES DAILY
Status: DISCONTINUED | OUTPATIENT
Start: 2023-09-01 | End: 2023-09-02 | Stop reason: HOSPADM

## 2023-09-01 RX ORDER — HYDROMORPHONE HCL IN WATER/PF 6 MG/30 ML
0.2 PATIENT CONTROLLED ANALGESIA SYRINGE INTRAVENOUS EVERY 5 MIN PRN
Status: DISCONTINUED | OUTPATIENT
Start: 2023-09-01 | End: 2023-09-01 | Stop reason: HOSPADM

## 2023-09-01 RX ORDER — LIDOCAINE 40 MG/G
CREAM TOPICAL
Status: DISCONTINUED | OUTPATIENT
Start: 2023-09-01 | End: 2023-09-02 | Stop reason: HOSPADM

## 2023-09-01 RX ORDER — SODIUM CHLORIDE, SODIUM LACTATE, POTASSIUM CHLORIDE, CALCIUM CHLORIDE 600; 310; 30; 20 MG/100ML; MG/100ML; MG/100ML; MG/100ML
INJECTION, SOLUTION INTRAVENOUS CONTINUOUS
Status: DISCONTINUED | OUTPATIENT
Start: 2023-09-01 | End: 2023-09-01 | Stop reason: HOSPADM

## 2023-09-01 RX ORDER — OXYCODONE HYDROCHLORIDE 5 MG/1
5 TABLET ORAL EVERY 6 HOURS PRN
Qty: 6 TABLET | Refills: 0 | Status: SHIPPED | OUTPATIENT
Start: 2023-09-01 | End: 2023-09-04

## 2023-09-01 RX ORDER — BISACODYL 10 MG
10 SUPPOSITORY, RECTAL RECTAL DAILY
Status: DISCONTINUED | OUTPATIENT
Start: 2023-09-02 | End: 2023-09-02 | Stop reason: HOSPADM

## 2023-09-01 RX ORDER — AMOXICILLIN 250 MG
1 CAPSULE ORAL 2 TIMES DAILY
Status: DISCONTINUED | OUTPATIENT
Start: 2023-09-01 | End: 2023-09-02 | Stop reason: HOSPADM

## 2023-09-01 RX ORDER — ACETAMINOPHEN 325 MG/1
650 TABLET ORAL EVERY 6 HOURS
Status: DISCONTINUED | OUTPATIENT
Start: 2023-09-01 | End: 2023-09-01

## 2023-09-01 RX ORDER — ACETAMINOPHEN 325 MG/1
975 TABLET ORAL ONCE
Status: DISCONTINUED | OUTPATIENT
Start: 2023-09-01 | End: 2023-09-01 | Stop reason: HOSPADM

## 2023-09-01 RX ORDER — SODIUM CHLORIDE, SODIUM LACTATE, POTASSIUM CHLORIDE, CALCIUM CHLORIDE 600; 310; 30; 20 MG/100ML; MG/100ML; MG/100ML; MG/100ML
INJECTION, SOLUTION INTRAVENOUS CONTINUOUS
Status: DISCONTINUED | OUTPATIENT
Start: 2023-09-01 | End: 2023-09-02

## 2023-09-01 RX ORDER — FLUMAZENIL 0.1 MG/ML
0.2 INJECTION, SOLUTION INTRAVENOUS
Status: DISCONTINUED | OUTPATIENT
Start: 2023-09-01 | End: 2023-09-01 | Stop reason: HOSPADM

## 2023-09-01 RX ORDER — FLUTICASONE PROPIONATE 50 MCG
2 SPRAY, SUSPENSION (ML) NASAL DAILY PRN
Status: DISCONTINUED | OUTPATIENT
Start: 2023-09-01 | End: 2023-09-02 | Stop reason: HOSPADM

## 2023-09-01 RX ORDER — CEFAZOLIN SODIUM/WATER 2 G/20 ML
2 SYRINGE (ML) INTRAVENOUS
Status: COMPLETED | OUTPATIENT
Start: 2023-09-01 | End: 2023-09-01

## 2023-09-01 RX ORDER — FENTANYL CITRATE 50 UG/ML
25 INJECTION, SOLUTION INTRAMUSCULAR; INTRAVENOUS EVERY 5 MIN PRN
Status: DISCONTINUED | OUTPATIENT
Start: 2023-09-01 | End: 2023-09-01 | Stop reason: HOSPADM

## 2023-09-01 RX ORDER — ALBUTEROL SULFATE 90 UG/1
1-2 AEROSOL, METERED RESPIRATORY (INHALATION) EVERY 6 HOURS PRN
Status: DISCONTINUED | OUTPATIENT
Start: 2023-09-01 | End: 2023-09-02 | Stop reason: HOSPADM

## 2023-09-01 RX ORDER — LIDOCAINE 40 MG/G
CREAM TOPICAL
Status: DISCONTINUED | OUTPATIENT
Start: 2023-09-01 | End: 2023-09-01 | Stop reason: HOSPADM

## 2023-09-01 RX ORDER — PHENYLEPHRINE HCL IN 0.9% NACL 50MG/250ML
.5-1.25 PLASTIC BAG, INJECTION (ML) INTRAVENOUS CONTINUOUS
Status: DISCONTINUED | OUTPATIENT
Start: 2023-09-01 | End: 2023-09-01

## 2023-09-01 RX ORDER — ACETAMINOPHEN 325 MG/1
650 TABLET ORAL EVERY 6 HOURS PRN
Qty: 24 TABLET | Refills: 0 | Status: SHIPPED | OUTPATIENT
Start: 2023-09-01 | End: 2023-10-02

## 2023-09-01 RX ORDER — DIPHENHYDRAMINE HYDROCHLORIDE 50 MG/ML
12.5 INJECTION INTRAMUSCULAR; INTRAVENOUS EVERY 6 HOURS PRN
Status: DISCONTINUED | OUTPATIENT
Start: 2023-09-01 | End: 2023-09-02 | Stop reason: HOSPADM

## 2023-09-01 RX ORDER — MONTELUKAST SODIUM 10 MG/1
10 TABLET ORAL AT BEDTIME
Status: DISCONTINUED | OUTPATIENT
Start: 2023-09-01 | End: 2023-09-02 | Stop reason: HOSPADM

## 2023-09-01 RX ORDER — ONDANSETRON 2 MG/ML
4 INJECTION INTRAMUSCULAR; INTRAVENOUS EVERY 30 MIN PRN
Status: DISCONTINUED | OUTPATIENT
Start: 2023-09-01 | End: 2023-09-01 | Stop reason: HOSPADM

## 2023-09-01 RX ORDER — AMOXICILLIN 250 MG
1-2 CAPSULE ORAL 2 TIMES DAILY
Qty: 30 TABLET | Refills: 0 | Status: SHIPPED | OUTPATIENT
Start: 2023-09-01 | End: 2023-10-02

## 2023-09-01 RX ORDER — NALOXONE HYDROCHLORIDE 0.4 MG/ML
0.2 INJECTION, SOLUTION INTRAMUSCULAR; INTRAVENOUS; SUBCUTANEOUS
Status: DISCONTINUED | OUTPATIENT
Start: 2023-09-01 | End: 2023-09-01 | Stop reason: HOSPADM

## 2023-09-01 RX ORDER — OXYCODONE HYDROCHLORIDE 5 MG/1
5 TABLET ORAL
Status: CANCELLED | OUTPATIENT
Start: 2023-09-01

## 2023-09-01 RX ORDER — PROPOFOL 10 MG/ML
INJECTION, EMULSION INTRAVENOUS PRN
Status: DISCONTINUED | OUTPATIENT
Start: 2023-09-01 | End: 2023-09-01

## 2023-09-01 RX ORDER — ALBUTEROL SULFATE 90 UG/1
AEROSOL, METERED RESPIRATORY (INHALATION) PRN
Status: DISCONTINUED | OUTPATIENT
Start: 2023-09-01 | End: 2023-09-01

## 2023-09-01 RX ORDER — GUAIFENESIN 600 MG/1
600 TABLET, EXTENDED RELEASE ORAL 2 TIMES DAILY
Status: DISCONTINUED | OUTPATIENT
Start: 2023-09-01 | End: 2023-09-02 | Stop reason: HOSPADM

## 2023-09-01 RX ORDER — CEFAZOLIN SODIUM/WATER 2 G/20 ML
2 SYRINGE (ML) INTRAVENOUS SEE ADMIN INSTRUCTIONS
Status: DISCONTINUED | OUTPATIENT
Start: 2023-09-01 | End: 2023-09-01 | Stop reason: HOSPADM

## 2023-09-01 RX ORDER — FENTANYL CITRATE 50 UG/ML
INJECTION, SOLUTION INTRAMUSCULAR; INTRAVENOUS PRN
Status: DISCONTINUED | OUTPATIENT
Start: 2023-09-01 | End: 2023-09-01

## 2023-09-01 RX ORDER — OXYCODONE HYDROCHLORIDE 10 MG/1
10 TABLET ORAL EVERY 4 HOURS PRN
Status: DISCONTINUED | OUTPATIENT
Start: 2023-09-01 | End: 2023-09-02 | Stop reason: HOSPADM

## 2023-09-01 RX ORDER — SODIUM CHLORIDE, SODIUM LACTATE, POTASSIUM CHLORIDE, CALCIUM CHLORIDE 600; 310; 30; 20 MG/100ML; MG/100ML; MG/100ML; MG/100ML
INJECTION, SOLUTION INTRAVENOUS CONTINUOUS PRN
Status: DISCONTINUED | OUTPATIENT
Start: 2023-09-01 | End: 2023-09-01

## 2023-09-01 RX ORDER — HYDRALAZINE HYDROCHLORIDE 20 MG/ML
2.5-5 INJECTION INTRAMUSCULAR; INTRAVENOUS EVERY 10 MIN PRN
Status: DISCONTINUED | OUTPATIENT
Start: 2023-09-01 | End: 2023-09-01 | Stop reason: HOSPADM

## 2023-09-01 RX ORDER — SIMVASTATIN 10 MG
10 TABLET ORAL AT BEDTIME
Status: DISCONTINUED | OUTPATIENT
Start: 2023-09-01 | End: 2023-09-02 | Stop reason: HOSPADM

## 2023-09-01 RX ORDER — ACETAMINOPHEN 325 MG/1
650 TABLET ORAL EVERY 6 HOURS
Status: DISCONTINUED | OUTPATIENT
Start: 2023-09-01 | End: 2023-09-02 | Stop reason: HOSPADM

## 2023-09-01 RX ORDER — HALOPERIDOL 5 MG/ML
1 INJECTION INTRAMUSCULAR
Status: DISCONTINUED | OUTPATIENT
Start: 2023-09-01 | End: 2023-09-01 | Stop reason: HOSPADM

## 2023-09-01 RX ORDER — OXYCODONE HYDROCHLORIDE 5 MG/1
5 TABLET ORAL
Status: DISCONTINUED | OUTPATIENT
Start: 2023-09-01 | End: 2023-09-01

## 2023-09-01 RX ORDER — FENTANYL CITRATE 50 UG/ML
50 INJECTION, SOLUTION INTRAMUSCULAR; INTRAVENOUS EVERY 5 MIN PRN
Status: DISCONTINUED | OUTPATIENT
Start: 2023-09-01 | End: 2023-09-01 | Stop reason: HOSPADM

## 2023-09-01 RX ORDER — ONDANSETRON 4 MG/1
4 TABLET, ORALLY DISINTEGRATING ORAL EVERY 8 HOURS PRN
Qty: 4 TABLET | Refills: 0 | Status: SHIPPED | OUTPATIENT
Start: 2023-09-01 | End: 2023-10-02

## 2023-09-01 RX ORDER — OXYCODONE HYDROCHLORIDE 5 MG/1
5 TABLET ORAL EVERY 4 HOURS PRN
Status: DISCONTINUED | OUTPATIENT
Start: 2023-09-01 | End: 2023-09-02 | Stop reason: HOSPADM

## 2023-09-01 RX ORDER — DEXAMETHASONE SODIUM PHOSPHATE 4 MG/ML
INJECTION, SOLUTION INTRA-ARTICULAR; INTRALESIONAL; INTRAMUSCULAR; INTRAVENOUS; SOFT TISSUE PRN
Status: DISCONTINUED | OUTPATIENT
Start: 2023-09-01 | End: 2023-09-01

## 2023-09-01 RX ORDER — ONDANSETRON 4 MG/1
4 TABLET, ORALLY DISINTEGRATING ORAL EVERY 6 HOURS PRN
Status: DISCONTINUED | OUTPATIENT
Start: 2023-09-01 | End: 2023-09-02 | Stop reason: HOSPADM

## 2023-09-01 RX ORDER — DIPHENHYDRAMINE HCL 12.5MG/5ML
12.5 LIQUID (ML) ORAL EVERY 6 HOURS PRN
Status: DISCONTINUED | OUTPATIENT
Start: 2023-09-01 | End: 2023-09-02 | Stop reason: HOSPADM

## 2023-09-01 RX ORDER — ACETAMINOPHEN 325 MG/1
975 TABLET ORAL ONCE
Status: CANCELLED | OUTPATIENT
Start: 2023-09-01 | End: 2023-09-01

## 2023-09-01 RX ORDER — BUPIVACAINE HYDROCHLORIDE 2.5 MG/ML
INJECTION, SOLUTION EPIDURAL; INFILTRATION; INTRACAUDAL
Status: COMPLETED | OUTPATIENT
Start: 2023-09-01 | End: 2023-09-01

## 2023-09-01 RX ORDER — ONDANSETRON 4 MG/1
4 TABLET, ORALLY DISINTEGRATING ORAL EVERY 30 MIN PRN
Status: DISCONTINUED | OUTPATIENT
Start: 2023-09-01 | End: 2023-09-01 | Stop reason: HOSPADM

## 2023-09-01 RX ORDER — CALCIUM CARBONATE 500 MG/1
500 TABLET, CHEWABLE ORAL 4 TIMES DAILY PRN
Status: DISCONTINUED | OUTPATIENT
Start: 2023-09-01 | End: 2023-09-02 | Stop reason: HOSPADM

## 2023-09-01 RX ORDER — NALOXONE HYDROCHLORIDE 0.4 MG/ML
0.4 INJECTION, SOLUTION INTRAMUSCULAR; INTRAVENOUS; SUBCUTANEOUS
Status: DISCONTINUED | OUTPATIENT
Start: 2023-09-01 | End: 2023-09-01 | Stop reason: HOSPADM

## 2023-09-01 RX ORDER — ALBUTEROL SULFATE 0.83 MG/ML
2.5 SOLUTION RESPIRATORY (INHALATION) ONCE
Status: COMPLETED | OUTPATIENT
Start: 2023-09-01 | End: 2023-09-01

## 2023-09-01 RX ORDER — LABETALOL 20 MG/4 ML (5 MG/ML) INTRAVENOUS SYRINGE
PRN
Status: DISCONTINUED | OUTPATIENT
Start: 2023-09-01 | End: 2023-09-01

## 2023-09-01 RX ORDER — HYDROXYZINE HYDROCHLORIDE 10 MG/1
10 TABLET, FILM COATED ORAL EVERY 6 HOURS PRN
Status: DISCONTINUED | OUTPATIENT
Start: 2023-09-01 | End: 2023-09-01 | Stop reason: HOSPADM

## 2023-09-01 RX ORDER — IBUPROFEN 200 MG
600 TABLET ORAL ONCE
Status: CANCELLED | OUTPATIENT
Start: 2023-09-01 | End: 2023-09-01

## 2023-09-01 RX ADMIN — SIMETHICONE 80 MG: 80 TABLET, CHEWABLE ORAL at 21:13

## 2023-09-01 RX ADMIN — ALBUTEROL SULFATE 2.5 MG: 2.5 SOLUTION RESPIRATORY (INHALATION) at 12:05

## 2023-09-01 RX ADMIN — Medication 20 MG: at 15:10

## 2023-09-01 RX ADMIN — ACETAMINOPHEN 975 MG: 325 TABLET, FILM COATED ORAL at 12:01

## 2023-09-01 RX ADMIN — FENTANYL CITRATE 50 MCG: 50 INJECTION, SOLUTION INTRAMUSCULAR; INTRAVENOUS at 11:20

## 2023-09-01 RX ADMIN — METRONIDAZOLE 500 MG: 500 INJECTION, SOLUTION INTRAVENOUS at 11:59

## 2023-09-01 RX ADMIN — SODIUM CHLORIDE, POTASSIUM CHLORIDE, SODIUM LACTATE AND CALCIUM CHLORIDE: 600; 310; 30; 20 INJECTION, SOLUTION INTRAVENOUS at 13:55

## 2023-09-01 RX ADMIN — ONDANSETRON 4 MG: 2 INJECTION INTRAMUSCULAR; INTRAVENOUS at 16:43

## 2023-09-01 RX ADMIN — BUPIVACAINE HYDROCHLORIDE 20 ML: 2.5 INJECTION, SOLUTION EPIDURAL; INFILTRATION; INTRACAUDAL; PERINEURAL at 11:30

## 2023-09-01 RX ADMIN — SIMVASTATIN 10 MG: 10 TABLET, FILM COATED ORAL at 21:11

## 2023-09-01 RX ADMIN — FENTANYL CITRATE 50 MCG: 50 INJECTION, SOLUTION INTRAMUSCULAR; INTRAVENOUS at 16:42

## 2023-09-01 RX ADMIN — PROPOFOL 60 MG: 10 INJECTION, EMULSION INTRAVENOUS at 14:06

## 2023-09-01 RX ADMIN — Medication 10 MG: at 16:30

## 2023-09-01 RX ADMIN — FENTANYL CITRATE 25 MCG: 50 INJECTION, SOLUTION INTRAMUSCULAR; INTRAVENOUS at 17:33

## 2023-09-01 RX ADMIN — MONTELUKAST 10 MG: 10 TABLET, FILM COATED ORAL at 21:12

## 2023-09-01 RX ADMIN — LABETALOL 20 MG/4 ML (5 MG/ML) INTRAVENOUS SYRINGE 2.5 MG: at 15:46

## 2023-09-01 RX ADMIN — SODIUM CHLORIDE, POTASSIUM CHLORIDE, SODIUM LACTATE AND CALCIUM CHLORIDE: 600; 310; 30; 20 INJECTION, SOLUTION INTRAVENOUS at 21:17

## 2023-09-01 RX ADMIN — GUAIFENESIN 600 MG: 600 TABLET ORAL at 21:11

## 2023-09-01 RX ADMIN — FENTANYL CITRATE 50 MCG: 50 INJECTION, SOLUTION INTRAMUSCULAR; INTRAVENOUS at 14:05

## 2023-09-01 RX ADMIN — Medication 10 MG: at 15:33

## 2023-09-01 RX ADMIN — ACETAMINOPHEN 650 MG: 325 TABLET, FILM COATED ORAL at 21:11

## 2023-09-01 RX ADMIN — LABETALOL 20 MG/4 ML (5 MG/ML) INTRAVENOUS SYRINGE 5 MG: at 15:23

## 2023-09-01 RX ADMIN — HEPARIN SODIUM 5000 UNITS: 5000 INJECTION, SOLUTION INTRAVENOUS; SUBCUTANEOUS at 12:04

## 2023-09-01 RX ADMIN — FENTANYL CITRATE 50 MCG: 50 INJECTION, SOLUTION INTRAMUSCULAR; INTRAVENOUS at 16:13

## 2023-09-01 RX ADMIN — SODIUM CHLORIDE, POTASSIUM CHLORIDE, SODIUM LACTATE AND CALCIUM CHLORIDE: 600; 310; 30; 20 INJECTION, SOLUTION INTRAVENOUS at 23:46

## 2023-09-01 RX ADMIN — SENNOSIDES AND DOCUSATE SODIUM 2 TABLET: 50; 8.6 TABLET ORAL at 21:12

## 2023-09-01 RX ADMIN — FENTANYL CITRATE 50 MCG: 50 INJECTION, SOLUTION INTRAMUSCULAR; INTRAVENOUS at 14:47

## 2023-09-01 RX ADMIN — LABETALOL 20 MG/4 ML (5 MG/ML) INTRAVENOUS SYRINGE 2.5 MG: at 15:32

## 2023-09-01 RX ADMIN — BUPIVACAINE 20 ML: 13.3 INJECTION, SUSPENSION, LIPOSOMAL INFILTRATION at 11:30

## 2023-09-01 RX ADMIN — PROPOFOL 60 MG: 10 INJECTION, EMULSION INTRAVENOUS at 14:05

## 2023-09-01 RX ADMIN — OXYCODONE HYDROCHLORIDE 5 MG: 5 TABLET ORAL at 21:16

## 2023-09-01 RX ADMIN — SUGAMMADEX 200 MG: 100 INJECTION, SOLUTION INTRAVENOUS at 16:53

## 2023-09-01 RX ADMIN — ALBUTEROL SULFATE 6 PUFF: 108 INHALANT RESPIRATORY (INHALATION) at 16:42

## 2023-09-01 RX ADMIN — Medication 50 MG: at 14:06

## 2023-09-01 RX ADMIN — FENTANYL CITRATE 50 MCG: 50 INJECTION, SOLUTION INTRAMUSCULAR; INTRAVENOUS at 11:25

## 2023-09-01 RX ADMIN — DEXAMETHASONE SODIUM PHOSPHATE 6 MG: 4 INJECTION, SOLUTION INTRA-ARTICULAR; INTRALESIONAL; INTRAMUSCULAR; INTRAVENOUS; SOFT TISSUE at 14:06

## 2023-09-01 RX ADMIN — SODIUM CHLORIDE, POTASSIUM CHLORIDE, SODIUM LACTATE AND CALCIUM CHLORIDE: 600; 310; 30; 20 INJECTION, SOLUTION INTRAVENOUS at 15:10

## 2023-09-01 RX ADMIN — Medication 2 G: at 14:16

## 2023-09-01 RX ADMIN — Medication 10 MG: at 16:13

## 2023-09-01 ASSESSMENT — ACTIVITIES OF DAILY LIVING (ADL)
ADLS_ACUITY_SCORE: 28
ADLS_ACUITY_SCORE: 26
ADLS_ACUITY_SCORE: 26
ADLS_ACUITY_SCORE: 28
ADLS_ACUITY_SCORE: 22
ADLS_ACUITY_SCORE: 26
ADLS_ACUITY_SCORE: 26

## 2023-09-01 NOTE — ANESTHESIA CARE TRANSFER NOTE
Patient: Sera Diaz    Procedure: Procedure(s):  Total laparoscopic hysterectomy, bilateral salpingo-oophorectomy  Cystoscopy       Diagnosis: Endometrial mass [N94.89]  Diagnosis Additional Information: No value filed.    Anesthesia Type:   General     Note:    Oropharynx: oropharynx clear of all foreign objects and spontaneously breathing  Level of Consciousness: drowsy  Oxygen Supplementation: face mask  Level of Supplemental Oxygen (L/min / FiO2): 4  Independent Airway: airway patency satisfactory and stable  Dentition: dentition unchanged  Vital Signs Stable: post-procedure vital signs reviewed and stable    Patient transferred to: PACU    Handoff Report: Identifed the Patient, Identified the Reponsible Provider, Reviewed the pertinent medical history, Discussed the surgical course, Reviewed Intra-OP anesthesia mangement and issues during anesthesia, Set expectations for post-procedure period and Allowed opportunity for questions and acknowledgement of understanding    Vitals:  Vitals Value Taken Time   /72 09/01/23 1703   Temp     Pulse 71 09/01/23 1708   Resp 12 09/01/23 1708   SpO2 97 % 09/01/23 1708   Vitals shown include unvalidated device data.    Electronically Signed By: CHARISMA Ryan CRNA  September 1, 2023  5:09 PM

## 2023-09-01 NOTE — ANESTHESIA PROCEDURE NOTES
Airway       Patient location during procedure: OR       Procedure Start/Stop Times: 9/1/2023 2:09 PM  Staff -        Anesthesiologist:  Luis Maloney MD       Resident/Fellow: Luba Torres MD       Performed By: residentIndications and Patient Condition       Indications for airway management: paul-procedural and airway protection       Induction type:intravenous       Mask difficulty assessment: 1 - vent by mask    Final Airway Details       Final airway type: endotracheal airway       Successful airway: ETT - single  Endotracheal Airway Details        ETT size (mm): 7.0       Cuffed: yes       Successful intubation technique: direct laryngoscopy       DL Blade Type: MAC 4       Grade View of Cords: 3       Adjucts: stylet       Position: Right       Measured from: gums/teeth       Secured at (cm): 22       Bite block used: Soft    Post intubation assessment        Placement verified by: capnometry, equal breath sounds and chest rise        Number of attempts at approach: 1       Number of other approaches attempted: 0       Secured with: pink tape       Ease of procedure: easy       Dentition: Unchanged    Medication(s) Administered   Medication Administration Time: 9/1/2023 2:09 PM

## 2023-09-01 NOTE — ANESTHESIA PROCEDURE NOTES
"TAP Procedure Note    Pre-Procedure   Staff -        Anesthesiologist:  Augusta Norris MD       Resident/Fellow: Chai Contreras Jr., MD       Performed By: resident       Location: pre-op       Procedure Start/Stop Times: 9/1/2023 11:20 AM and 9/1/2023 11:30 AM       Pre-Anesthestic Checklist: patient identified, IV checked, site marked, risks and benefits discussed, informed consent, monitors and equipment checked, pre-op evaluation, at physician/surgeon's request and post-op pain management  Timeout:       Correct Patient: Yes        Correct Procedure: Yes        Correct Site: Yes        Correct Position: Yes        Correct Laterality: Yes        Site Marked: Yes  Procedure Documentation  Procedure: TAP       Laterality: bilateral       Patient Position: supine       Skin prep: Chloraprep       Insertion Site: T11-12.       Needle Gauge: 21.        Needle Length (millimeters): 110        Ultrasound guided       1. Ultrasound was used to identify targeted nerve, plexus, vascular marker, or fascial plane and place a needle adjacent to it in real-time.       2. Ultrasound was used to visualize the spread of anesthetic in close proximity to the above referenced structure.       3. A permanent image is entered into the patient's record.    Assessment/Narrative         The placement was negative for: blood aspirated, painful injection and site bleeding       Paresthesias: No.       Bolus given via needle..        Secured via.        Insertion/Infusion Method: Single Shot       Complications: none    Medication(s) Administered   Bupivacaine 0.25% PF (Infiltration) - Infiltration   20 mL - 9/1/2023 11:30:00 AM  Bupivacaine liposome (Exparel) 1.3% LA inj susp (Infiltration) - Infiltration   20 mL - 9/1/2023 11:30:00 AM  Medication Administration Time: 9/1/2023 11:20 AM      FOR [facility] ([facility]) ONLY:   Pain Team Contact information: please page the Pain Team Via [facility]. Search \"Pain\". During daytime " hours, please page the attending first. At night please page the resident first.

## 2023-09-01 NOTE — PROGRESS NOTES
Gynecology Oncology Post Op Check Note  09/01/2023    POD#0 s/p TLH, BSO, cystoscopy    Disease: Endometrial mass, MRI 7/21 atypical 2.8 cm mass arising from the left uterine body. Frozen: benign    Subjective: Patient is doing well. Pain is well controlled though having some gas pain. No nausea/vomiting. Vazquez out, voiding spontaneously. History of incontinence so purewick in place. No bowel function yet. Has bothersome cough/phlegm. Otherwise, denies complaints.    Objective:   Vitals:    09/01/23 1900 09/01/23 1915 09/01/23 1930 09/01/23 2000   BP: 129/69 136/60 136/60 135/46   BP Location:    Right arm   Pulse: 85 80 82 83   Resp: 18 18 21 14   Temp:    97.9  F (36.6  C)   TempSrc:    Oral   SpO2: 93% 97% 96% 93%   Weight:       Height:         General: Appears comfortable in bed, in NAD  CV: RRR, Well-perfused  Resp: CTAB, Breathing comfortably on room air  Abdomen: Soft, non-tender, non-distended  Incision: Laparoscopic incisions covered with clean bandages  Extremities: Nontender, no edema, SCDs in place    No intake/output data recorded.    Assessment: Sera Diaz is a 87 year old female POD#0 s/p TLH, BSO, cystoscopy    Plan:    Dz: Endometrial mass, MRI 7/21 atypical 2.8 cm mass arising from the left uterine body. Frozen: benign  - Frozen pathology in process; will follow up in the outpatient setting regarding further treatment     # Routine post-op  FEN: Regular diet, mIVF  Pain: Sched tylenol, toradol > ibu. PRN oxycodone  Heme: Hgb 14.6> EBL 20  GI: Bowel regimen, antiemetics PRN  : Voiding spontaneously  PPX: SCDs, IS    # Cough  - Mucinex ordered per patient request    # Chronic problems  - HTN: No medications   - HLD: PTA Simvastatin (ord'd)   - Asthma - PTA fluticasone, albuterol, singulair, mometazone (ord'd)    - GERD - PTA omeprazole (ord'd)   - Restless legs - no interventions    Dispo: Home in AM   Drains/Lines: CHELSEA Awan MD  Ob/Gyn PGY-2  09/01/23 8:51 PM    To reach the  GYNECOLOGIC ONCOLOGY resident pager: [phone].

## 2023-09-01 NOTE — OP NOTE
[facility] - Operative Note    Pre-operative diagnosis:   Endometrial mass [N94.89]  Urinary incontinence    Post-operative diagnosis:   Benign endometrial polyp on frozen pathology    Procedure(s):  Total laparoscopic hysterectomy, bilateral salpingo-oophorectomy  Cystoscopy    Surgeon(s) and Role:     * Tono Mendez MD - Primary     * Taisha Santillan MD - Resident - Assisting     * Jasson Dunn MD - Fellow - Assisting    Anesthesia:   General with Block     EBL: 20ml    IVF:  1700ml    UOP: 125ml    Drains: None    Specimen(s):  ID Type Source Tests Collected by Time Destination   1 : Uterus, Cervix, Bilateral Fallopian Tubes and Ovaries Tissue Uterus, Cervix, Bilateral Fallopian Tubes & Ovaries SURGICAL PATHOLOGY EXAM Tono Mendez MD 9/1/2023  4:01 PM        Findings: EUA with small, mobile, anteverted uterus, no palpable adnexal masses. Laparoscopic evaluation without evidence of injury from entry. Normal upper abdominal survey. Omental adhesions to the anterior abdominal wall on the right. Cecum adherent to the pelvic side wall. These adhesions were left in place. Uterus slightly enlarged and lobular in appearance. Ovaries normal in appearance bilaterally. Portion of fallopian tubes surgically absent bilaterally, fimbria normal in appearance. Dense adhesions of the bladder to the uterus. Hysterectomy performed, pedicles and vaginal cuff hemostatic at end of case. Cystoscopy with brisk bilateral ureteral efflux, no evidence of injury or suture to bladder dome.       Indications: Sera Diaz is a 87 year old female who was found to have an incidentally noted endometrial mass on MRI and was referred to Gyn Onc. After counseling, she preferred to proceed with TLH, BSO and frozen pathology with additional staging as needed if any evidence of malignancy. All risks, benefits and alternatives were discussed and written informed consent was obtained.     Procedure: The  patient was taken to the operating room where general anesthesia was induced without difficulty. She was then examined under anesthesia with the above noted findings. She was then prepped and draped in the normal sterile fashion in the dorsal lithotomy position using yellow fin stirrups. Speculum exam was performed and a Vcare was placed.     Attention was then turned to the abdomen where a 5 mm umbilical skin incision was made. The Veress needle was then introduced into the peritoneal cavity while tenting the abdominal wall. Intraperitoneal placement was confirmed by use of a water-filled syringe and drop in intra-abdominal pressure <5 mmHg with insufflation of CO2 gas. The gas was turned on and pneumoperitoneum was achieved using CO2 gas. A 5mm trocar was placed under direct visualization with the laparoscope. A second 5mm skin incision was then made in the right lower abdomen away from anterior abdominal wall adhesions and the trocar and sleeve advanced under direct visualization. Two more ports were placed on the left both were 5mm.    A survey of the patient's abdomen and pelvis was made with the above noted findings. Attention was then turned to the pelvis where the right round ligament was then sealed and transected using the Ligasure device. The retroperitoneum was opened and the ureter was identified. The right infundibulopelvic ligament was then divided using the Ligasure device. The posterior leaf of the broad ligament was transected. The anterior leaf of the broad ligament was then incised along the bladder reflection to the midlineThe same procedure was carried out on the contralateral side. From this side, the anterior leaf of the broad ligament was incised along the bladder reflection to the midline. Dense adhesions of the bladder to the lower uterine segment were encountered. I spent awhile taking these down using cautery to identify an appropriate plane. No clear plane was ever identified, but the  bladder was retracted below the cervix. The uterine arteries were then sealed and transected using the Ligasure device, down to the level of the colpotomy ring. The vagina was transected along the VCare cup using bovie cautery to amputate the specimen.  The uterus, tubes, and ovaries were then delivered through the vagina.  The cuff suture was introduced into the abdomen and occluder balloon was placed in the vagina to maintain pneumoperitoneum.  The vaginal cuff was then closed with running stitches of 0 V-Loc.  The pelvis was irrigated and hemostasis was noted.  The ports were removed and the pneumoperitoneum allowed to escape.  The port sites were closed with Monocryl and sterile dressings were placed. All counts were correct prior to closure.  The occluder balloon and Vazquez were removed.  The patient was returned supine.  She was awakened, extubated, and taken to recovery room in stable condition.    Dr. Mendez was present and scrubbed for the entire procedure.    Taisha Santillan MD PGY-4  [facility] Gynecology Oncology     Attending Attestation  I was present for and participated in the entire procedure. I agree with the procedure as documented in the note above, which I have edited as necessary.     Tono Mendez MD    Gynecologic Oncology   Tono Mendez MD

## 2023-09-01 NOTE — PROGRESS NOTES
Gynecology Oncology Progress Note  09/02/2023    POD#1 s/p TLH, BSO, cystoscopy    Disease: Endometrial mass, MRI 7/21 atypical 2.8 cm mass arising from the left uterine body. Frozen: benign    Subjective: Patient is doing well this morning.  Pain is well controlled.  Tolerating PO, passing flatus, voiding spontaneously. Denies chest pain, SOB, nausea/vomiting, fevers/chills, dizziness.    Objective:   Vitals:    09/02/23 0500 09/02/23 0559 09/02/23 0625 09/02/23 0721   BP:    128/48   BP Location:    Right arm   Pulse:    63   Resp:    17   Temp:    97.9  F (36.6  C)   TempSrc:    Oral   SpO2: 97% 94% 94% 96%   Weight:       Height:         General: Appears comfortable in bed, in NAD  CV: Regular rate, well-perfused  Resp: Breathing comfortably on room air  Abdomen: Soft, non-tender, non-distended  Incision: laparoscopic incisions covered with clean bandages   Extremities: Nontender, no edema, SCDs in place    I/O last 3 completed shifts:  In: 2943.75 [P.O.:590; I.V.:2353.75]  Out: 1750 [Urine:1725; Blood:25]    New labs/imaging-   Latest Reference Range & Units 09/02/23 05:58   Sodium 136 - 145 mmol/L 140   Potassium 3.4 - 5.3 mmol/L 3.6   Chloride 98 - 107 mmol/L 105   Carbon Dioxide (CO2) 22 - 29 mmol/L 23   Urea Nitrogen 8.0 - 23.0 mg/dL 14.7   Creatinine 0.51 - 0.95 mg/dL 0.78   GFR Estimate >60 mL/min/1.73m2 73   Calcium 8.8 - 10.2 mg/dL 9.0   Anion Gap 7 - 15 mmol/L 12   Glucose 70 - 99 mg/dL 121 (H)     Assessment: Sera Diaz is a 87 year old female POD#1 s/p TLH, BSO, cystoscopy    Plan:    Dz: Endometrial mass, MRI 7/21 atypical 2.8 cm mass arising from the left uterine body. Frozen: benign  - Frozen pathology in process; will follow up in the outpatient setting regarding further treatment     # Routine post-op  FEN: Regular diet  Pain: Sched tylenol, toradol > ibu. PRN oxycodone  Heme: Hgb 14.6> EBL 20  GI: Bowel regimen, antiemetics PRN  : Voiding spontaneously  PPX: SCDs, IS    # Chronic  problems  - HTN: No medications   - HLD: PTA Simvastatin (ord'd)   - Asthma - PTA fluticasone, albuterol, singulair, mometazone (ord'd)    - GERD - PTA omeprazole (ord'd)   - Restless legs - no interventions    Dispo: Home  Drains/Lines: PIV    Sakina Awan MD  Ob/Gyn PGY-2  09/02/23 7:35 AM      To reach the GYNECOLOGIC ONCOLOGY resident pager: [phone].    The patient was seen and examined with the resident, the labs and vital signs were reviewed. The medical care plan outlined above was provided under my directives and direct supervision.     Rachel Salvador MD, MPH  Gynecologic Oncology

## 2023-09-01 NOTE — BRIEF OP NOTE
[facility]    Brief Operative Note    Pre-operative diagnosis: Endometrial mass [N94.89]  Post-operative diagnosis Same as pre-operative diagnosis    Procedure: Procedure(s):  Total laparoscopic hysterectomy, bilateral salpingo-oophorectomy  Cystoscopy  Surgeon: Surgeon(s) and Role:     * Tono Mendez MD - Primary     * Taisha Santillan MD - Resident - Assisting     * Jasson Dunn MD - Fellow - Assisting  Anesthesia: General with Block   Estimated Blood Loss: 25 mL from 9/1/2023  1:53 PM to 9/1/2023  5:01 PM  IVF: 1700 ml crystalloid  UOP: 125 ml, clear urine     Drains: None  Specimens:   ID Type Source Tests Collected by Time Destination   1 : Uterus, Cervix, Bilateral Fallopian Tubes and Ovaries Tissue Uterus, Cervix, Bilateral Fallopian Tubes & Ovaries SURGICAL PATHOLOGY EXAM Tono Mendez MD 9/1/2023  4:01 PM      Findings: EUA with small, mobile, anteverted uterus, no palpable adnexal masses. Laparoscopic evaluation without evidence of injury from entry. Normal upper abdominal survey. Omental adhesions to the anterior abdominal wall on the right. Cecum adherent to the pelvic side wall. Uterus slightly enlarged and globular in appearance. Ovaries normal in appearance bilaterally. Portion of fallopian tubes surgically absent bilaterally, fimbria normal in appearance. Hysterectomy performed, pedicles and vaginal cuff hemostatic at end of case. Cystoscopy with brisk bilateral ureteral efflux, no evidence of injury or suture to bladder dome.      Complications: None.  Implants: * No implants in log *      Taisha Santillan MD PGY-4  [facility] Gynecology Oncology   Gyn Onc Pager: [phone]  09/01/23 5:17 PM

## 2023-09-01 NOTE — OR NURSING
TAP block completed. Timeout performed prior to block. Pt tolerated well. Will continue to monitor.

## 2023-09-01 NOTE — PROGRESS NOTES
SPIRITUAL HEALTH SERVICES Progress Note  [facility] Surgery Waiting Area    Pt had requested  visit - appears the request was made before pt admitted today for surgery. When I noticed the request, pt was already in OR. I spoke with pt's daughter and son in surgery waiting area, confirmed that pt will be in OR for some time yet. I let them know I would ask the Saturday on-call  to check in on pt to offer spiritual support. Per chart, pt is PAMELA Melton - children confirmed that fact.     Magdiel Smith M.Div. (Bill), [facility]  Staff   Pager [phone]      * [facility] remains available 24/7 for emergent requests/referrals, either by having the switchboard page the on-call  or by entering an ASAP/STAT consult in Epic (this will also page the on-call ). Routine Epic consults receive an initial response within 24 hours.*

## 2023-09-02 VITALS
HEART RATE: 66 BPM | DIASTOLIC BLOOD PRESSURE: 50 MMHG | RESPIRATION RATE: 17 BRPM | BODY MASS INDEX: 36.82 KG/M2 | HEIGHT: 65 IN | WEIGHT: 221 LBS | OXYGEN SATURATION: 93 % | SYSTOLIC BLOOD PRESSURE: 136 MMHG | TEMPERATURE: 97.6 F

## 2023-09-02 LAB
ANION GAP SERPL CALCULATED.3IONS-SCNC: 12 MMOL/L (ref 7–15)
BASOPHILS # BLD AUTO: 0 10E3/UL (ref 0–0.2)
BASOPHILS NFR BLD AUTO: 0 %
BUN SERPL-MCNC: 14.7 MG/DL (ref 8–23)
CALCIUM SERPL-MCNC: 9 MG/DL (ref 8.8–10.2)
CHLORIDE SERPL-SCNC: 105 MMOL/L (ref 98–107)
CREAT SERPL-MCNC: 0.78 MG/DL (ref 0.51–0.95)
DEPRECATED HCO3 PLAS-SCNC: 23 MMOL/L (ref 22–29)
EOSINOPHIL # BLD AUTO: 0 10E3/UL (ref 0–0.7)
EOSINOPHIL NFR BLD AUTO: 0 %
ERYTHROCYTE [DISTWIDTH] IN BLOOD BY AUTOMATED COUNT: 14.5 % (ref 10–15)
GFR SERPL CREATININE-BSD FRML MDRD: 73 ML/MIN/1.73M2
GLUCOSE SERPL-MCNC: 121 MG/DL (ref 70–99)
HCT VFR BLD AUTO: 41.7 % (ref 35–47)
HGB BLD-MCNC: 13.3 G/DL (ref 11.7–15.7)
HOLD SPECIMEN: NORMAL
IMM GRANULOCYTES # BLD: 0 10E3/UL
IMM GRANULOCYTES NFR BLD: 0 %
LYMPHOCYTES # BLD AUTO: 0.8 10E3/UL (ref 0.8–5.3)
LYMPHOCYTES NFR BLD AUTO: 9 %
MCH RBC QN AUTO: 29.3 PG (ref 26.5–33)
MCHC RBC AUTO-ENTMCNC: 31.9 G/DL (ref 31.5–36.5)
MCV RBC AUTO: 92 FL (ref 78–100)
MONOCYTES # BLD AUTO: 0.7 10E3/UL (ref 0–1.3)
MONOCYTES NFR BLD AUTO: 8 %
NEUTROPHILS # BLD AUTO: 7.7 10E3/UL (ref 1.6–8.3)
NEUTROPHILS NFR BLD AUTO: 83 %
NRBC # BLD AUTO: 0 10E3/UL
NRBC BLD AUTO-RTO: 0 /100
PLATELET # BLD AUTO: 209 10E3/UL (ref 150–450)
POTASSIUM SERPL-SCNC: 3.6 MMOL/L (ref 3.4–5.3)
RBC # BLD AUTO: 4.54 10E6/UL (ref 3.8–5.2)
SODIUM SERPL-SCNC: 140 MMOL/L (ref 136–145)
WBC # BLD AUTO: 9.3 10E3/UL (ref 4–11)

## 2023-09-02 PROCEDURE — 250N000013 HC RX MED GY IP 250 OP 250 PS 637: Performed by: STUDENT IN AN ORGANIZED HEALTH CARE EDUCATION/TRAINING PROGRAM

## 2023-09-02 PROCEDURE — 85025 COMPLETE CBC W/AUTO DIFF WBC: CPT | Performed by: STUDENT IN AN ORGANIZED HEALTH CARE EDUCATION/TRAINING PROGRAM

## 2023-09-02 PROCEDURE — 36415 COLL VENOUS BLD VENIPUNCTURE: CPT | Performed by: STUDENT IN AN ORGANIZED HEALTH CARE EDUCATION/TRAINING PROGRAM

## 2023-09-02 PROCEDURE — 250N000013 HC RX MED GY IP 250 OP 250 PS 637

## 2023-09-02 PROCEDURE — 99024 POSTOP FOLLOW-UP VISIT: CPT | Performed by: OBSTETRICS & GYNECOLOGY

## 2023-09-02 PROCEDURE — 80048 BASIC METABOLIC PNL TOTAL CA: CPT | Performed by: STUDENT IN AN ORGANIZED HEALTH CARE EDUCATION/TRAINING PROGRAM

## 2023-09-02 RX ADMIN — GUAIFENESIN 600 MG: 600 TABLET ORAL at 07:34

## 2023-09-02 RX ADMIN — MOMETASONE FUROATE 1 PUFF: 220 INHALANT RESPIRATORY (INHALATION) at 11:49

## 2023-09-02 RX ADMIN — ACETAMINOPHEN 650 MG: 325 TABLET, FILM COATED ORAL at 07:34

## 2023-09-02 RX ADMIN — SENNOSIDES AND DOCUSATE SODIUM 1 TABLET: 50; 8.6 TABLET ORAL at 07:33

## 2023-09-02 RX ADMIN — ACETAMINOPHEN 650 MG: 325 TABLET, FILM COATED ORAL at 13:21

## 2023-09-02 RX ADMIN — OMEPRAZOLE 20 MG: 20 CAPSULE, DELAYED RELEASE ORAL at 07:34

## 2023-09-02 RX ADMIN — OXYCODONE HYDROCHLORIDE 5 MG: 5 TABLET ORAL at 17:52

## 2023-09-02 RX ADMIN — ACETAMINOPHEN 650 MG: 325 TABLET, FILM COATED ORAL at 02:26

## 2023-09-02 RX ADMIN — OXYCODONE HYDROCHLORIDE 5 MG: 5 TABLET ORAL at 07:32

## 2023-09-02 ASSESSMENT — ACTIVITIES OF DAILY LIVING (ADL)
ADLS_ACUITY_SCORE: 35
ADLS_ACUITY_SCORE: 32
ADLS_ACUITY_SCORE: 34
ADLS_ACUITY_SCORE: 34
ADLS_ACUITY_SCORE: 32
ADLS_ACUITY_SCORE: 35
ADLS_ACUITY_SCORE: 34
ADLS_ACUITY_SCORE: 34
ADLS_ACUITY_SCORE: 35

## 2023-09-02 NOTE — OR NURSING
Pt met goals of phase 1 care. Pt's questions were answered and care needs addressed. Pt denies any additional concerns at this time. Writer deemed pt stable and appropriate to transition to inpatient unit.

## 2023-09-02 NOTE — DISCHARGE SUMMARY
Gynecologic Oncology Discharge Summary    Sera Diaz  [id number]    Admit Date: 9/1/2023  Discharge Date: 9/2/23  Admitting Provider: Rachel Salvador MD  Discharge Provider: Rachel Salvador MD    Admission Dx:   - Endometrial mass  - HTN   - HLD   - Asthma   - GERD  - Restless legs     Discharge Dx:  - Endometrial mass  - HTN   - HLD   - Asthma   - GERD  - Restless legs     Patient Active Problem List   Diagnosis    Trochanteric bursitis    Panlobular emphysema (H)    Malignant melanoma of skin (H)    Post-operative pain       Procedures:   - TLH, BSO, cystoscopy    Prior to Admission Medications:  Facility-Administered Medications Prior to Admission   Medication Dose Route Frequency Provider Last Rate Last Admin    triamcinolone (KENALOG-40) injection 12 mg  12 mg Intra-Lesional Once Syed Newton MD         Medications Prior to Admission   Medication Sig Dispense Refill Last Dose    levalbuterol (XOPENEX HFA) 45 MCG/ACT inhaler    8/17/2023    mometasone (ASMANEX TWISTHALER) 220 MCG/ACT inhaler Inhale 1 puff into the lungs 2 times daily   9/1/2023    montelukast (SINGULAIR) 10 MG tablet Take 10 mg by mouth At Bedtime       nystatin (MYCOSTATIN) 813935 UNIT/GM external cream APPLY TO AFFECTED AREA TWICE DAILY UNTIL HEALED.   8/31/2023    omeprazole (PRILOSEC) 20 MG DR capsule TAKE 1 CAPSULE BY MOUTH EVERY DAY   8/31/2023    Propylene Glycol (SYSTANE COMPLETE OP) Apply to eye as needed       simvastatin (ZOCOR) 10 MG tablet Take 10 mg by mouth At Bedtime   8/31/2023    triamterene-HCTZ (DYAZIDE) 37.5-25 MG capsule TAKE 1 CAPSULE BY MOUTH EVERY MORNING ORALLY ONCE A DAY   8/31/2023    BETAMETHASONE PO    8/21/2023    BRYCE-GEST ANTACID 500 MG chewable tablet Take 2 chew tab by mouth 2 times daily   8/20/2023    cholecalciferol 25 MCG (1000 UT) TABS Take 1,000 Units by mouth   8/20/2023    diazepam (VALIUM) 2 MG tablet Take by mouth every 6 hours as needed for anxiety       docosanol (ABREVA) 10 % CREA cream Apply  topically 5 times daily       fluticasone (FLONASE) 50 MCG/ACT nasal spray Spray 2 sprays in nostril   8/15/2023    loperamide (IMODIUM) 2 MG capsule Take 2 mg by mouth 4 times daily as needed for diarrhea   8/20/2023    Multiple Vitamins-Minerals (MULTIVITAMIN ADULTS 50+ PO)        Multiple Vitamins-Minerals (PRESERVISION AREDS PO)        prednisoLONE 5 MG tablet Take 5 mg by mouth as needed       predniSONE (DELTASONE) 5 MG tablet 2 tabs po tid x 2 days then 2 tabs po bid x 2 days then 2 tabs po daily x 2 days then 1 tab po daily x 1 day prn asthma flare       zoledronic acid (ZOMETA) 4 MG/5ML injection Inject 4 mg into the vein once yearly       [DISCONTINUED] amLODIPine (NORVASC) 2.5 MG tablet Take 2.5 mg by mouth daily (Patient not taking: Reported on 8/30/2023)   Not Taking    [DISCONTINUED] CVS MUCUS EXTENDED RELEASE 600 MG 12 hr tablet TAKE 1 TABLET (600 MG) BY MOUTH TWICE DAILY AS NEEDED (PHLEGM). (Patient not taking: Reported on 8/9/2023)       [DISCONTINUED] dextromethorphan-guaiFENesin (MUCINEX DM)  MG 12 hr tablet Take 1 tablet by mouth every 12 hours   8/24/2023    [DISCONTINUED] valACYclovir (VALTREX) 1000 mg tablet  (Patient not taking: Reported on 8/30/2023)   Not Taking       Discharge Medications:     Review of your medicines        START taking        Dose / Directions   acetaminophen 325 MG tablet  Commonly known as: TYLENOL      Dose: 650 mg  Take 2 tablets (650 mg) by mouth every 6 hours as needed for mild pain  Quantity: 24 tablet  Refills: 0     ondansetron 4 MG ODT tab  Commonly known as: ZOFRAN ODT      Dose: 4 mg  Take 1 tablet (4 mg) by mouth every 8 hours as needed for nausea  Quantity: 4 tablet  Refills: 0     oxyCODONE 5 MG tablet  Commonly known as: ROXICODONE      Dose: 5 mg  Take 1 tablet (5 mg) by mouth every 6 hours as needed for severe pain  Quantity: 6 tablet  Refills: 0     senna-docusate 8.6-50 MG tablet  Commonly known as: SENOKOT-S/PERICOLACE      Dose: 1-2  tablet  Take 1-2 tablets by mouth 2 times daily  Quantity: 30 tablet  Refills: 0            CONTINUE these medicines which have NOT CHANGED        Dose / Directions   BETAMETHASONE PO      Refills: 0     Migel-Gest Antacid 500 MG chewable tablet  Generic drug: calcium carbonate      Dose: 2 chew tab  Take 2 chew tab by mouth 2 times daily  Refills: 0     diazepam 2 MG tablet  Commonly known as: VALIUM      Take by mouth every 6 hours as needed for anxiety  Refills: 0     docosanol 10 % Crea cream  Commonly known as: ABREVA      Apply topically 5 times daily  Refills: 0     fluticasone 50 MCG/ACT nasal spray  Commonly known as: FLONASE      Dose: 2 spray  Spray 2 sprays in nostril  Refills: 0     levalbuterol 45 MCG/ACT inhaler  Commonly known as: XOPENEX HFA      Refills: 0     loperamide 2 MG capsule  Commonly known as: IMODIUM      Dose: 2 mg  Take 2 mg by mouth 4 times daily as needed for diarrhea  Refills: 0     mometasone 220 MCG/ACT inhaler  Commonly known as: ASMANEX TWISTHALER      Dose: 1 puff  Inhale 1 puff into the lungs 2 times daily  Refills: 0     montelukast 10 MG tablet  Commonly known as: SINGULAIR      Dose: 10 mg  Take 10 mg by mouth At Bedtime  Refills: 0     * MULTIVITAMIN ADULTS 50+ PO      Refills: 0     * PRESERVISION AREDS PO      Refills: 0     nystatin 712210 UNIT/GM external cream  Commonly known as: MYCOSTATIN      APPLY TO AFFECTED AREA TWICE DAILY UNTIL HEALED.  Refills: 0     omeprazole 20 MG DR capsule  Commonly known as: PriLOSEC      TAKE 1 CAPSULE BY MOUTH EVERY DAY  Refills: 0     prednisoLONE 5 MG tablet      Dose: 5 mg  Take 5 mg by mouth as needed  Refills: 0     predniSONE 5 MG tablet  Commonly known as: DELTASONE      2 tabs po tid x 2 days then 2 tabs po bid x 2 days then 2 tabs po daily x 2 days then 1 tab po daily x 1 day prn asthma flare  Refills: 0     simvastatin 10 MG tablet  Commonly known as: ZOCOR      Dose: 10 mg  Take 10 mg by mouth At Bedtime  Refills: 0      SYSTANE COMPLETE OP      Apply to eye as needed  Refills: 0     triamterene-HCTZ 37.5-25 MG capsule  Commonly known as: DYAZIDE      TAKE 1 CAPSULE BY MOUTH EVERY MORNING ORALLY ONCE A DAY  Refills: 0     Vitamin D3 25 mcg (1000 units) tablet  Commonly known as: CHOLECALCIFEROL      Dose: 1,000 Units  Take 1,000 Units by mouth  Refills: 0     zoledronic acid 4 MG/5ML injection  Commonly known as: ZOMETA      Dose: 4 mg  Inject 4 mg into the vein once yearly  Refills: 0           * This list has 2 medication(s) that are the same as other medications prescribed for you. Read the directions carefully, and ask your doctor or other care provider to review them with you.                STOP taking      amLODIPine 2.5 MG tablet  Commonly known as: NORVASC        CVS Mucus Extended Release 600 MG 12 hr tablet  Generic drug: guaiFENesin        dextromethorphan-guaiFENesin  MG 12 hr tablet        valACYclovir 1000 mg tablet  Commonly known as: VALTREX                  Where to get your medicines        These medications were sent to [facility] - [address] [address]      Phone: [phone]   acetaminophen 325 MG tablet  ondansetron 4 MG ODT tab  oxyCODONE 5 MG tablet  senna-docusate 8.6-50 MG tablet       Consultations:  - None    Brief History of Illness:  From H&P: Patient presented for scheduled procedure. She was admitted to observation overnight as she was not yet meeting postoperative milestones for discharge, namely ambulation and comfort with discharge.     Hospital Course:  Dz:   - Preoperative diagnosis was endometrial mass.  Frozen section at the time of the surgery showed benign pathology.  Final pathology is pending at the time of discharge.  She will follow-up postoperatively for a care plan.  FEN:   - She was started on a regular diet post-operatively.  By discharge, she was tolerating a regular diet without nausea and  vomiting and able to maintain her hydration without IVF supplementation.  Pain:   - Her pain was initially controlled on IV pain medications.  Once tolerating PO pain meds, she was transitioned to a PO pain regimen.  Her pain was well controlled on this and she was discharged home with these medications.  CV:   - For her HTN, she is on no PTA medications. For her HLD, her PTA Simvastatin was continued inpatient. Her vital signs were stable while in house and she had no acute CV issues.  PULM:   - For her asthma, her PTA fluticasone, albuterol, singulair, and mometazole were ordered. She was initially given O2 supplementation in to maintain her O2 sats in the immediate postop period and was transitioned off of this without difficulty.  By discharge, her O2 sats were greater than 94% on RA.  She was encouraged to use her bedside IS while in house.  She had no acute pulmonary issues while in house.  HEME:   - She had no acute heme issues while in house.  GI:   - For her GERD, her PTA omeprazole was continued. At the time of discharge, she was tolerating a regular diet without nausea and vomiting.  She was passing flatus prior to discharge. She will be discharged with a bowel regimen to prevent constipation in the postoperative period.  She had no acute GI issues while in house.   :    - A dumont catheter was placed at the time of the surgery and removed at completion of the case. Prior to discharge, the patient was voiding spontaneously without difficulty.  She had no acute  issues while in house.  ID:   - The patient was afebrile during her hospitalization.  She received standard preoperative antibiotics without incident.    ENDO:   - No issues  PSYCH/NEURO:   - For her restless legs she has no interventions.   PPX:    -  She was given SCDs and IS during her hospital course.  She tolerated these prophylactic interventions without incident.  They were discontinued at the time of her discharge.      Discharge  Instructions and Follow up:  Sera Diaz was discharged from the hospital with follow up for 9/20/23 with Dr. Mendez.    Discharge Diet: Regular  Discharge Activity: Lifting restricted to 15 pounds, no driving while on narcotics, nothing per vagina for 8 weeks.  Discharge Follow up: 9/20 with Dr. Mendez    Discharge Disposition:  Discharged to home    Discharge Staff: MD Taisha Beck MD PGY-4  [facility] Gynecology Oncology   Gyn Onc Pager: [phone]  09/02/23 3:03 PM    The patient was seen and examined with the resident, the labs and vital signs were reviewed.The discharge care plan outlined above was provided under my directives and direct supervision. Patient ready for discharge.     Total time of 40 minutes in discharge planning and coordination of follow-up    Rachel Salvador MD, MPH  Gynecologic Oncology

## 2023-09-02 NOTE — PROGRESS NOTES
[id number]  Patient discharged home and discharge education was completed with patient and children, they verbalized understanding. PIV was removed and questions were answered. VSS at discharge. Patient left the unit via W/C transport by family.

## 2023-09-02 NOTE — PROGRESS NOTES
"SPIRITUAL HEALTH SERVICES Progress Note  [facility]  UNIT 5A  ON CALL    Saw pt Sera Diaz per follow up from Friday on call .    Illness Narrative - Today Sera reflected on the 7 week wait for her hysterectomy yesterday. This morning she is recovering and in good spirits, as she received news from pathology that \"at first pass\" a mass in her uterus was not cancerous.    Distress - Distress was most significant in lead up to surgery, as she had a long time to ruminate on potentially difficult biopsy results. Sera's middle daughter  in her early 50s from a \"rare\" uterine cancer, which also led to Sera reflecting on these difficult times waiting for her own care.    Coping - Primary themes of today's visit were relief and gratitude: relief that thus far biopsy results are negative, as well as gratitude for family (especially adult children and grandchildren) and friends who support her. She also was appreciative of a visit from a volunteer who talked about basketball with her.    Sera described herself as a \"lapsed [employer]\" but maintains a meaningful connection to [facility] in [place], as the  there provided invaluable support to Sera and her family during her late daughter's illness, and continues to do so through periodic check ins. She occasionally worships there and connects with the congregations social justice work.    Meaning-Making - Close family and friend relationships have played an important role in Sera's emotional and spiritual wellbeing, and continue to do so during this hospitalization.     Plan - Sera expects to be discharged later today; no follow up anticipated in light of this. Spiritual Health Services remains available for patient, family, and staff support.         LYNNE Clayton.   Associate   Pager: [phone]    * [facility] remains available  for emergent requests/referrals, either by having the switchboard page the on-call  or " by entering an ASAP/STAT consult in Epic (this will also page the on-call ). Routine Epic consults receive an initial response within 24 hours.*

## 2023-09-02 NOTE — PROGRESS NOTES
Admitted/transferred from: PACU  2 RN skin assessment completed by Kristie Gordon RN and Linda HICKS RN  Skin assessment findin lap sites with dressings in place. Bruise on L upper arm   Interventions/actions: No adjustment needed  Will continue to monitor.

## 2023-09-02 NOTE — PLAN OF CARE
[id number]: A&Ox4, VSS on 1L oxygen. Pain managed w/ tylenol. Pt up most of the night due to reporting not being tired. Denies nausea. Lap sites covered, dried drainage marked. Pure wick in place, good urine output. IV fluids d/c'd this morning. Pt stood by bed on evening shift, steady on her feet. Pt reports mucinex is working to help w/ her mucous.

## 2023-09-02 NOTE — PLAN OF CARE
Goal Outcome Evaluation:      Plan of Care Reviewed With: patient    Overall Patient Progress: improvingOverall Patient Progress: improving    Abdominal lap sites dry/intact with dressings, passing gas, no stool, tolerating regular diet. Incontinent of urine at baseline, has been using pure-wick in bed.Oxycodone/Tylenol for incisional pain with relief, unsteady this am with ambulation, unsteadiness much improved this afternoon. Possible discharge later this afternoon.

## 2023-09-02 NOTE — ANESTHESIA POSTPROCEDURE EVALUATION
Patient: Sera Diaz    Procedure: Procedure(s):  Total laparoscopic hysterectomy, bilateral salpingo-oophorectomy  Cystoscopy       Anesthesia Type:  General    Note:  Disposition: Admission; Inpatient   Postop Pain Control: Uneventful            Sign Out: Well controlled pain   PONV: No   Neuro/Psych: Uneventful            Sign Out: Acceptable/Baseline neuro status   Airway/Respiratory: Uneventful            Sign Out: Acceptable/Baseline resp. status   CV/Hemodynamics: Uneventful            Sign Out: Acceptable CV status; No obvious hypovolemia; No obvious fluid overload   Other NRE: NONE   DID A NON-ROUTINE EVENT OCCUR? No           Last vitals:  Vitals Value Taken Time   /60 09/01/23 1915   Temp 35  C (95  F) 09/01/23 1704   Pulse 82 09/01/23 1930   Resp 21 09/01/23 1930   SpO2 96 % 09/01/23 1930   Vitals shown include unvalidated device data.    Electronically Signed By: Jordan Tello MD  September 1, 2023  7:31 PM

## 2023-09-02 NOTE — PLAN OF CARE
"POD0  Total laparoscopic hysterectomy, bilateral salpingo-oophorectomy, cystoscopy  VS: /45 (BP Location: Right arm)   Pulse 81   Temp 97.9  F (36.6  C) (Oral)   Resp 15   Ht 1.651 m (5' 5\")   Wt 99.1 kg (218 lb 7.6 oz)   SpO2 96%   BMI 36.36 kg/m     Neuro: A&O X4, able to make needs known  Respiratory: On 3 LPM via NC, no report of SOB. Infrequent non productive cough, IS encouraged. Mucinex given   GI/: Purewick in place with output. BS bypoactive, no gas or BM   Diet/appetite: Regular diet with good appetite. No report of nausea  Activity: Up to bedside, marched in place. SBA  Pain: Abdominal pain managed with tylenol and oxycodone with some relief. Gas pain managed with simethicone.   Skin/drains: 4 lap sites with dressing in place, scant drainage on one of the dressings.   Lines: Bilalaral PIV. R PIV infusing LR @75ml/hr, L saline locked.   Continue to manage pain and monitor for ROBF. Continue POC.   "

## 2023-09-02 NOTE — UTILIZATION REVIEW
Admission Status; Secondary Review Determination       Under the authority of the Utilization Management Committee, the utilization review process indicated a secondary review on the above patient. The review outcome is based on review of the medical records, discussions with staff, and applying clinical experience noted on the date of the review.     (x) Outpatient Status with extended recovery is appropriate - This patient does not meet hospital inpatient criteria. If this patient's primary payer is Medicare and was admitted as an inpatient, Condition Code 44 should be used and patient status changed to outpatient recovery.    RATIONALE FOR DETERMINATION       The patient is a 87-year-old woman who was found with 2.8 endometrial mass that appeared typical on MRI and she was recommended  TLH, BSO, cystoscopy which was performed on September 1, 2023.  Frozen pathology report showed benign tumor.  Patient is doing well this morning.  Pain is well controlled.  Tolerating PO, passing flatus, voiding spontaneously. Denies chest pain, SOB, nausea/vomiting, fevers/chills, dizziness.      Patient was admitted to the hospital after the procedure. Patient has Medicare and the procedure is not on the CMS inpatient list. No documented complications or unexpected recovery. Patient can be safely  monitored for bleeding and recover in outpatient/extended recovery setting. The severity of illness, intensity of service provided, expected LOS and risk for adverse outcome doesn't meet inpatient hospital admission. Dr. Taisha Santillan notified.    This document was produced using voice recognition software.     The information on this document is developed by the utilization review team in order for the business office to ensure compliance. This only denotes the appropriateness of proper admission status and does not reflect the quality of care rendered.   The definitions of Inpatient Status and Observation Status used in  making the determination above are those provided in the CMS Coverage Manual, Chapter 1 and Chapter 6, section 70.4.   Sincerely,   GRACIELA ANGEL MD   Utilization Review  Physician Advisor  [facility]

## 2023-09-05 ENCOUNTER — PATIENT OUTREACH (OUTPATIENT)
Dept: ONCOLOGY | Facility: CLINIC | Age: 88
End: 2023-09-05

## 2023-09-05 NOTE — PROGRESS NOTES
RESEARCH STUDY VISIT NOTE      Date of Visit: September 1, 2023     Subject Name: Sera Diaz  Subject ID: Not applicable     Visit: Consenting visit for non-therapeutic study     Informed Consent Note (study listed below)      The consent form, including purpose, risks and benefits, was reviewed with Sera Diaz, and all questions were answered before she signed the consent form. The patient understands that the study involves a one-time sample day of surgery.    Present during the discussion was the patient, family members, and Ruthie (new Coordinator). A copy of the signed form was provided to the patient. No procedures specific to this study were performed prior to the patient signing the consent form. Copies of the consent forms were scanned into Epic.     Consent obtained by: Ruthie Zepeda  Date obtained: 9/1/2023  HIPAA authorization signed?: yes    Mock Pap ([facility]# [id number]; Consent version date: [id number]; HIPAA authorizaton version date: [email or web address])    Research Study Plan:  Patient was seen Friday 9/1/2023 by the research coordinator and obtained signed consent. Surgery is scheduled for 9/1/2023. Samples for research studies were collected during the surgery.     Cheyenne Zepeda, Clinical Research Coordinator  Department of Obstetrics, Gynecology and Women's Health   [employer]  [email or web address]  Phone: [phone]  Email: [email or web address]

## 2023-09-08 LAB
PATH REPORT.COMMENTS IMP SPEC: NORMAL
PATH REPORT.FINAL DX SPEC: NORMAL
PATH REPORT.GROSS SPEC: NORMAL
PATH REPORT.INTRAOP OBS SPEC DOC: NORMAL
PATH REPORT.MICROSCOPIC SPEC OTHER STN: NORMAL
PATH REPORT.RELEVANT HX SPEC: NORMAL
PHOTO IMAGE: NORMAL

## 2023-09-12 ENCOUNTER — DOCUMENTATION ONLY (OUTPATIENT)
Dept: OTHER | Facility: CLINIC | Age: 88
End: 2023-09-12
Payer: MEDICARE

## 2023-09-20 ENCOUNTER — ONCOLOGY VISIT (OUTPATIENT)
Dept: ONCOLOGY | Facility: CLINIC | Age: 88
End: 2023-09-20
Attending: OBSTETRICS & GYNECOLOGY
Payer: MEDICARE

## 2023-09-20 VITALS
SYSTOLIC BLOOD PRESSURE: 135 MMHG | OXYGEN SATURATION: 96 % | WEIGHT: 219.7 LBS | HEART RATE: 67 BPM | BODY MASS INDEX: 36.56 KG/M2 | RESPIRATION RATE: 16 BRPM | TEMPERATURE: 97.6 F | DIASTOLIC BLOOD PRESSURE: 81 MMHG

## 2023-09-20 DIAGNOSIS — N84.0 ENDOMETRIAL POLYP: Primary | ICD-10-CM

## 2023-09-20 PROCEDURE — G0463 HOSPITAL OUTPT CLINIC VISIT: HCPCS | Performed by: OBSTETRICS & GYNECOLOGY

## 2023-09-20 PROCEDURE — 99024 POSTOP FOLLOW-UP VISIT: CPT | Performed by: OBSTETRICS & GYNECOLOGY

## 2023-09-20 ASSESSMENT — PAIN SCALES - GENERAL: PAINLEVEL: NO PAIN (0)

## 2023-09-20 NOTE — LETTER
9/20/2023         RE: Sera Diaz  [address]  [place] [address]        Dear Colleague,    Thank you for referring your patient, Sera Diaz, to the [facility]. Please see a copy of my visit note below.    Gynecologic Oncology Clinic - Post-operative appointment    Visit date: Sep 20, 2023     Reason for visit: post-op    Interval history: Sera Diaz is a 87 year old here for routine post-op exam after surgery to remove the uterus due to endometrial mass which returned benign on final pathology.     Doing well. Somewhat fatigued but preparing for family reunion. Overall no specific concerns.      Past Surgical History:   Procedure Laterality Date    CYSTOSCOPY N/A 9/1/2023    Procedure: Cystoscopy;  Surgeon: Tono Mendez MD;  Location: [facility] OR    EYE SURGERY      LAPAROSCOPIC HYSTERECTOMY TOTAL, BILATERAL SALPINGO-OOPHORECTOMY, NODE DISSECTION, COMBINED Bilateral 9/1/2023    Procedure: Total laparoscopic hysterectomy, bilateral salpingo-oophorectomy;  Surgeon: Tono Mendez MD;  Location: [facility] OR    OPEN REDUCTION INTERNAL FIXATION ANKLE      TUBAL LIGATION         Physical Exam:  /81 (BP Location: Right arm, Patient Position: Sitting, Cuff Size: Adult Large)   Pulse 67   Temp 97.6  F (36.4  C) (Oral)   Resp 16   Wt 99.7 kg (219 lb 11.2 oz)   SpO2 96%   BMI 36.56 kg/m     General appearance: no acute distress, well groomed, sitting comfortably   Abdomen: incision(s) healing well    Pathology:  Lab Results   Component Value Date    CASEREPORT  09/01/2023     Surgical Pathology Report                         Case: [id number]                                  Authorizing Provider:  Tono Mendez MD         Collected:           09/01/2023 04:01 PM          Ordering Location:      MAIN OR                 Received:            09/01/2023 04:06 PM          Pathologist:           Caroline Quiros MD                                                     Intraop:               Lyn Vuong MD                                                           Specimen:    Uterus, Cervix, Bilateral Fallopian Tubes & Ovaries, Uterus, Cervix, Bilateral                      Fallopian Tubes and Ovaries                                                                FINALDX  09/01/2023     Uterus, cervix, bilateral fallopian tubes and ovaries, hysterectomy with bilateral salpingo-oophorectomy:  -Benign endometrial polyp, background atrophic endometrium  -Adenomyosis with atrophic changes  -Leiomyoma (3 mm) of cervix  -Bilateral fallopian tubes, no significant histologic abnormalities  -Bilateral ovaries with atrophic changes  -Negative for malignancy           Assessment/Plan: Sera Diaz is a 87 year old with benign pathology following Total laparoscopic hysterectomy, bilateral salpingo-oophorectomy for endometrial mass which was polyp on final pathology.    - Reviewed pathology showing benign pathology.   - Continue post-op restrictions until 6 weeks post-operatively.   - No further follow-up needed in our clinic unless post-op concerns, which should be brought to us. Recovery seems to be going well. Discharge from clinic unless new needs arise.     Tono Mendez MD

## 2023-09-20 NOTE — NURSING NOTE
"Oncology Rooming Note    September 20, 2023 2:34 PM   Sera Diaz is a 87 year old female who presents for:    Chief Complaint   Patient presents with    Oncology Clinic Visit     Endometrial Mass     Initial Vitals: /81 (BP Location: Right arm, Patient Position: Sitting, Cuff Size: Adult Large)   Pulse 67   Temp 97.6  F (36.4  C) (Oral)   Resp 16   Wt 99.7 kg (219 lb 11.2 oz)   SpO2 96%   BMI 36.56 kg/m   Estimated body mass index is 36.56 kg/m  as calculated from the following:    Height as of 9/1/23: 1.651 m (5' 5\").    Weight as of this encounter: 99.7 kg (219 lb 11.2 oz). Body surface area is 2.14 meters squared.  No Pain (0) Comment: Data Unavailable   No LMP recorded. Patient is postmenopausal.  Allergies reviewed: Yes  Medications reviewed: Yes    Medications: Medication refills not needed today.  Pharmacy name entered into [employer]: [id number] - [place], [place] -  [address]    Clinical concerns: Patient states there are no new concerns to discuss with provider.       Desiree Otto              "

## 2023-09-20 NOTE — PROGRESS NOTES
Gynecologic Oncology Clinic - Post-operative appointment    Visit date: Sep 20, 2023     Reason for visit: post-op    Interval history: Sera Diaz is a 87 year old here for routine post-op exam after surgery to remove the uterus due to endometrial mass which returned benign on final pathology.     Doing well. Somewhat fatigued but preparing for family reunion. Overall no specific concerns.      Past Surgical History:   Procedure Laterality Date    CYSTOSCOPY N/A 9/1/2023    Procedure: Cystoscopy;  Surgeon: Tono Mendez MD;  Location:  OR    EYE SURGERY      LAPAROSCOPIC HYSTERECTOMY TOTAL, BILATERAL SALPINGO-OOPHORECTOMY, NODE DISSECTION, COMBINED Bilateral 9/1/2023    Procedure: Total laparoscopic hysterectomy, bilateral salpingo-oophorectomy;  Surgeon: Tono Mendez MD;  Location:  OR    OPEN REDUCTION INTERNAL FIXATION ANKLE      TUBAL LIGATION         Physical Exam:  /81 (BP Location: Right arm, Patient Position: Sitting, Cuff Size: Adult Large)   Pulse 67   Temp 97.6  F (36.4  C) (Oral)   Resp 16   Wt 99.7 kg (219 lb 11.2 oz)   SpO2 96%   BMI 36.56 kg/m     General appearance: no acute distress, well groomed, sitting comfortably   Abdomen: incision(s) healing well    Pathology:  Lab Results   Component Value Date    CASEREPORT  09/01/2023     Surgical Pathology Report                         Case: [id number]                                  Authorizing Provider:  Tono Mendez MD         Collected:           09/01/2023 04:01 PM          Ordering Location:      MAIN OR                 Received:            09/01/2023 04:06 PM          Pathologist:           Caroline Quiros MD                                                    Intraop:               Lyn Vuong MD                                                           Specimen:    Uterus, Cervix, Bilateral Fallopian Tubes & Ovaries, Uterus, Cervix, Bilateral                      Fallopian Tubes and Ovaries                                                                 FINALDX  09/01/2023     Uterus, cervix, bilateral fallopian tubes and ovaries, hysterectomy with bilateral salpingo-oophorectomy:  -Benign endometrial polyp, background atrophic endometrium  -Adenomyosis with atrophic changes  -Leiomyoma (3 mm) of cervix  -Bilateral fallopian tubes, no significant histologic abnormalities  -Bilateral ovaries with atrophic changes  -Negative for malignancy           Assessment/Plan: Sera Diaz is a 87 year old with benign pathology following Total laparoscopic hysterectomy, bilateral salpingo-oophorectomy for endometrial mass which was polyp on final pathology.    - Reviewed pathology showing benign pathology.   - Continue post-op restrictions until 6 weeks post-operatively.   - No further follow-up needed in our clinic unless post-op concerns, which should be brought to us. Recovery seems to be going well. Discharge from clinic unless new needs arise.     Tono Mendez MD

## 2023-09-23 NOTE — PATIENT INSTRUCTIONS
It was a pleasure seeing you in clinic today-please reach out if there are any further questions that arise following today's visit.  During business hours, you may reach me at [phone].  For urgent/emergent questions after business hours, you may reach the on-call Gynecologic Oncology Resident through the [facility] at [phone].    All normal test results are usually communicated via letter or C[employer] message.  Abnormal results (those that require a change in the previously discussed plan of care) are usually communicated via a phone call.    I recommend signing up for [employer] access if you have not already done so.  This allows you online access to your lab results and also helps you communicate efficiently with your clinic should any questions arise in your care.    No follow-up in gyn oncology  Continue regular screening visit with primary care and gynecology    Dr Eyal Kim RN  Phone:  [phone]  Fax:  [phone]

## 2024-01-20 ENCOUNTER — HEALTH MAINTENANCE LETTER (OUTPATIENT)
Age: 89
End: 2024-01-20

## 2025-01-26 ENCOUNTER — HEALTH MAINTENANCE LETTER (OUTPATIENT)
Age: OVER 89
End: 2025-01-26

## 2025-06-27 ENCOUNTER — ANCILLARY PROCEDURE (OUTPATIENT)
Dept: MAMMOGRAPHY | Facility: CLINIC | Age: OVER 89
End: 2025-06-27
Attending: SURGERY
Payer: MEDICARE

## 2025-06-27 VITALS — BODY MASS INDEX: 37.77 KG/M2 | WEIGHT: 227 LBS

## 2025-06-27 DIAGNOSIS — Z85.3 HISTORY OF INVASIVE BREAST CANCER: ICD-10-CM

## 2025-06-27 LAB
CREAT BLD-MCNC: 0.9 MG/DL (ref 0.5–1)
EGFRCR SERPLBLD CKD-EPI 2021: >60 ML/MIN/1.73M2

## 2025-06-27 PROCEDURE — G0279 TOMOSYNTHESIS, MAMMO: HCPCS | Performed by: STUDENT IN AN ORGANIZED HEALTH CARE EDUCATION/TRAINING PROGRAM

## 2025-06-27 PROCEDURE — 76642 ULTRASOUND BREAST LIMITED: CPT | Mod: LT | Performed by: STUDENT IN AN ORGANIZED HEALTH CARE EDUCATION/TRAINING PROGRAM

## 2025-06-27 PROCEDURE — 82565 ASSAY OF CREATININE: CPT | Performed by: PATHOLOGY

## 2025-06-27 PROCEDURE — 77066 DX MAMMO INCL CAD BI: CPT | Performed by: STUDENT IN AN ORGANIZED HEALTH CARE EDUCATION/TRAINING PROGRAM

## 2025-06-27 RX ORDER — IOPAMIDOL 755 MG/ML
100 INJECTION, SOLUTION INTRAVASCULAR ONCE
Status: COMPLETED | OUTPATIENT
Start: 2025-06-27 | End: 2025-06-27

## 2025-06-27 RX ADMIN — IOPAMIDOL 25 ML: 755 INJECTION, SOLUTION INTRAVASCULAR at 11:33

## 2025-06-27 RX ADMIN — IOPAMIDOL 100 ML: 755 INJECTION, SOLUTION INTRAVASCULAR at 11:32

## (undated) DEVICE — GLOVE BIOGEL PI MICRO SZ 6.5 48565

## (undated) DEVICE — SOL ADH LIQUID BENZOIN SWAB 0.6ML C1544

## (undated) DEVICE — LINEN TOWEL PACK X6 WHITE 5487

## (undated) DEVICE — ESU GROUND PAD ADULT W/CORD E7507

## (undated) DEVICE — WIPES FOLEY CARE SURESTEP PROVON DFC100

## (undated) DEVICE — KOH COLPOTOMIZER OCCLUDER  CPO-6

## (undated) DEVICE — PREP CHLORAPREP 26ML TINTED HI-LITE ORANGE 930815

## (undated) DEVICE — JELLY LUBRICATING SURGILUBE 2OZ TUBE

## (undated) DEVICE — TUBING SMOKE EVAC PNEUMOCLEAR HIGH FLOW 0620050250

## (undated) DEVICE — APPLICATOR COTTON TIP 6"X2 STERILE LF 6012

## (undated) DEVICE — KIT PATIENT POSITIONING PIGAZZI LATEX FREE 40580

## (undated) DEVICE — SOL NACL 0.9% INJ 1000ML BAG 07983-09

## (undated) DEVICE — SOL NACL 0.9% INJ 1000ML BAG 2B1324X

## (undated) DEVICE — RETR ELEV / UTERINE MANIPULATOR V-CARE LG CUP 60-6085-202A

## (undated) DEVICE — TUBING IRRIG CYSTO/BLADDER SET 81" LF 2C4040

## (undated) DEVICE — PREP SCRUB SOL EXIDINE 4% CHG 4OZ 29002-404

## (undated) DEVICE — ESU LIGASURE MARYLAND LAPAROSCOPIC SLR/DVDR 5MMX37CM LF1937

## (undated) DEVICE — Device

## (undated) DEVICE — SPECIMEN TRAP MUCOUS 40ML LUKI C30200A

## (undated) DEVICE — SOL NACL 0.9% IRRIG 1000ML BOTTLE 2F7124

## (undated) DEVICE — SUCTION MANIFOLD NEPTUNE 2 SYS 4 PORT 0702-020-000

## (undated) DEVICE — LINEN TOWEL PACK X30 5481

## (undated) DEVICE — NDL INSUFFLATION 13GA 120MM C2201

## (undated) DEVICE — PROTECTOR ARM ONE-STEP TRENDELENBURG 40418

## (undated) DEVICE — ENDO TROCAR FIRST ENTRY KII FIOS Z-THRD 05X100MM CTF03

## (undated) DEVICE — DRSG PRIMAPORE 02X3" 7133

## (undated) DEVICE — APPLICATORS COTTON TIP 6"X2 STERILE LF C15053-006

## (undated) DEVICE — ESU PENCIL W/COATED BLADE E2450H

## (undated) DEVICE — ANTIFOG SOLUTION W/FOAM PAD 31142527

## (undated) DEVICE — SUCTION IRR STRYKERFLOW II W/TIP 250-070-520

## (undated) DEVICE — GLOVE BIOGEL PI MICRO INDICATOR UNDERGLOVE SZ 7.0 48970

## (undated) DEVICE — BLADE CLIPPER SGL USE 9680

## (undated) DEVICE — ENDO TROCAR SLEEVE KII Z-THREADED 05X100MM CTS02

## (undated) DEVICE — SOL WATER IRRIG 1000ML BOTTLE 2F7114

## (undated) DEVICE — SU WND CLOSURE VLOC 180 ABS 0 9" GS-21 VLOCL0346

## (undated) DEVICE — ESU ELEC CLEANCOAT LAP L-HOOK 36CM E3773-36C

## (undated) RX ORDER — HEPARIN SODIUM 5000 [USP'U]/.5ML
INJECTION, SOLUTION INTRAVENOUS; SUBCUTANEOUS
Status: DISPENSED
Start: 2023-09-01

## (undated) RX ORDER — METRONIDAZOLE 500 MG/100ML
INJECTION, SOLUTION INTRAVENOUS
Status: DISPENSED
Start: 2023-09-01

## (undated) RX ORDER — FENTANYL CITRATE 50 UG/ML
INJECTION, SOLUTION INTRAMUSCULAR; INTRAVENOUS
Status: DISPENSED
Start: 2023-09-01

## (undated) RX ORDER — LABETALOL HYDROCHLORIDE 5 MG/ML
INJECTION, SOLUTION INTRAVENOUS
Status: DISPENSED
Start: 2023-09-01

## (undated) RX ORDER — TRIAMCINOLONE ACETONIDE 40 MG/ML
INJECTION, SUSPENSION INTRA-ARTICULAR; INTRAMUSCULAR
Status: DISPENSED
Start: 2022-05-25

## (undated) RX ORDER — CEFAZOLIN SODIUM/WATER 2 G/20 ML
SYRINGE (ML) INTRAVENOUS
Status: DISPENSED
Start: 2023-09-01

## (undated) RX ORDER — ALBUTEROL SULFATE 0.83 MG/ML
SOLUTION RESPIRATORY (INHALATION)
Status: DISPENSED
Start: 2023-09-01

## (undated) RX ORDER — TRIAMCINOLONE ACETONIDE 40 MG/ML
INJECTION, SUSPENSION INTRA-ARTICULAR; INTRAMUSCULAR
Status: DISPENSED
Start: 2023-01-31

## (undated) RX ORDER — ONDANSETRON 2 MG/ML
INJECTION INTRAMUSCULAR; INTRAVENOUS
Status: DISPENSED
Start: 2023-09-01

## (undated) RX ORDER — ACETAMINOPHEN 325 MG/1
TABLET ORAL
Status: DISPENSED
Start: 2023-09-01

## (undated) RX ORDER — TRIAMCINOLONE ACETONIDE 40 MG/ML
INJECTION, SUSPENSION INTRA-ARTICULAR; INTRAMUSCULAR
Status: DISPENSED
Start: 2022-08-30